# Patient Record
Sex: FEMALE
[De-identification: names, ages, dates, MRNs, and addresses within clinical notes are randomized per-mention and may not be internally consistent; named-entity substitution may affect disease eponyms.]

---

## 2022-09-26 ENCOUNTER — HOSPITAL ENCOUNTER (EMERGENCY)
Dept: HOSPITAL 75 - ER | Age: 37
Discharge: HOME | End: 2022-09-26
Payer: SELF-PAY

## 2022-09-26 VITALS — BODY MASS INDEX: 29.08 KG/M2 | HEIGHT: 62.01 IN | WEIGHT: 160.06 LBS

## 2022-09-26 VITALS — SYSTOLIC BLOOD PRESSURE: 114 MMHG | DIASTOLIC BLOOD PRESSURE: 69 MMHG

## 2022-09-26 DIAGNOSIS — K20.90: Primary | ICD-10-CM

## 2022-09-26 DIAGNOSIS — Z28.310: ICD-10-CM

## 2022-09-26 LAB
ALBUMIN SERPL-MCNC: 4 GM/DL (ref 3.2–4.5)
ALP SERPL-CCNC: 114 U/L (ref 40–136)
ALT SERPL-CCNC: 222 U/L (ref 0–55)
APTT BLD: 30 SEC (ref 24–35)
BASOPHILS # BLD AUTO: 0.1 10^3/UL (ref 0–0.1)
BASOPHILS NFR BLD AUTO: 1 % (ref 0–10)
BILIRUB SERPL-MCNC: 0.6 MG/DL (ref 0.1–1)
BUN/CREAT SERPL: 13
CALCIUM SERPL-MCNC: 9.2 MG/DL (ref 8.5–10.1)
CHLORIDE SERPL-SCNC: 103 MMOL/L (ref 98–107)
CO2 SERPL-SCNC: 25 MMOL/L (ref 21–32)
CREAT SERPL-MCNC: 0.93 MG/DL (ref 0.6–1.3)
EOSINOPHIL # BLD AUTO: 0.3 10^3/UL (ref 0–0.3)
EOSINOPHIL NFR BLD AUTO: 3 % (ref 0–10)
GFR SERPLBLD BASED ON 1.73 SQ M-ARVRAT: 81 ML/MIN
GLUCOSE SERPL-MCNC: 143 MG/DL (ref 70–105)
HCT VFR BLD CALC: 40 % (ref 35–52)
HGB BLD-MCNC: 13.3 G/DL (ref 11.5–16)
INR PPP: 1 (ref 0.8–1.4)
LYMPHOCYTES # BLD AUTO: 2.2 10^3/UL (ref 1–4)
LYMPHOCYTES NFR BLD AUTO: 24 % (ref 12–44)
MAGNESIUM SERPL-MCNC: 2.1 MG/DL (ref 1.6–2.4)
MANUAL DIFFERENTIAL PERFORMED BLD QL: NO
MCH RBC QN AUTO: 27 PG (ref 25–34)
MCHC RBC AUTO-ENTMCNC: 33 G/DL (ref 32–36)
MCV RBC AUTO: 82 FL (ref 80–99)
MONOCYTES # BLD AUTO: 0.6 10^3/UL (ref 0–1)
MONOCYTES NFR BLD AUTO: 7 % (ref 0–12)
NEUTROPHILS # BLD AUTO: 6 10^3/UL (ref 1.8–7.8)
NEUTROPHILS NFR BLD AUTO: 66 % (ref 42–75)
PLATELET # BLD: 345 10^3/UL (ref 130–400)
PMV BLD AUTO: 10.1 FL (ref 9–12.2)
POTASSIUM SERPL-SCNC: 4.1 MMOL/L (ref 3.6–5)
PROT SERPL-MCNC: 8.3 GM/DL (ref 6.4–8.2)
PROTHROMBIN TIME: 13.7 SEC (ref 12.2–14.7)
SODIUM SERPL-SCNC: 139 MMOL/L (ref 135–145)
WBC # BLD AUTO: 9.1 10^3/UL (ref 4.3–11)

## 2022-09-26 PROCEDURE — 83874 ASSAY OF MYOGLOBIN: CPT

## 2022-09-26 PROCEDURE — 85730 THROMBOPLASTIN TIME PARTIAL: CPT

## 2022-09-26 PROCEDURE — 93005 ELECTROCARDIOGRAM TRACING: CPT

## 2022-09-26 PROCEDURE — 36415 COLL VENOUS BLD VENIPUNCTURE: CPT

## 2022-09-26 PROCEDURE — 85379 FIBRIN DEGRADATION QUANT: CPT

## 2022-09-26 PROCEDURE — 84484 ASSAY OF TROPONIN QUANT: CPT

## 2022-09-26 PROCEDURE — 71045 X-RAY EXAM CHEST 1 VIEW: CPT

## 2022-09-26 PROCEDURE — 80053 COMPREHEN METABOLIC PANEL: CPT

## 2022-09-26 PROCEDURE — 85610 PROTHROMBIN TIME: CPT

## 2022-09-26 PROCEDURE — 85025 COMPLETE CBC W/AUTO DIFF WBC: CPT

## 2022-09-26 PROCEDURE — 83880 ASSAY OF NATRIURETIC PEPTIDE: CPT

## 2022-09-26 PROCEDURE — 83735 ASSAY OF MAGNESIUM: CPT

## 2022-09-26 PROCEDURE — 93041 RHYTHM ECG TRACING: CPT

## 2022-09-26 NOTE — ED CHEST PAIN
General


Chief Complaint:  Chest Pain


Stated Complaint:  CHEST PAIN


Nursing Triage Note:  


PT TO RM 4 VIA WC W C/O CP SX APPROX 0900 MID CHEST, PAIN RELIEVED BY DRINKING 


MILK. PT A&OX4.


Source:  patient


Exam Limitations:  no limitations





History of Present Illness


Date Seen by Provider:  Sep 26, 2022


Time Seen by Provider:  13:09


Initial Comments


To ER by private vehicle from home with reports of central chest pain rated at 9

out of 10 that started at 9 AM this morning while at work.  No fevers chills 

cough or shortness of breath.  She occasionally has this at home and drinking 

milk helps.  She has had this before while in the [place] and was going

to see a doctor but decided to just pray about it instead.  She takes no 

medications.


Timing/Duration:  changing over time


Severity/Quality:  moderate, severe


Location:  central


Radiation:  no radiation


Activities at Onset:  none


Prior CP/Workup:  no prior chest pain


ASA po PTA:  No


NTG SL PTA:  No


Associated Symptoms:  denies symptoms





Allergies and Home Medications


Allergies


Coded Allergies:  


     No Known Drug Allergies (Unverified , 9/26/22)





Patient Home Medication List


Home Medication List Reviewed:  Yes





Review of Systems


Review of Systems


Constitutional:  see HPI


EENTM:  No Symptoms Reported


Respiratory:  No Symptoms Reported


Cardiovascular:  See HPI, Chest Pain


Gastrointestinal:  No Symptoms Reported


Genitourinary:  No Symptoms Reported


Musculoskeletal:  no symptoms reported


Skin:  no symptoms reported


Psychiatric/Neurological:  No Symptoms Reported


Endocrine:  No Symptoms Reported


Hematologic/Lymphatic:  No Symptoms Reported





Past Medical-Social-Family Hx


Patient Social History


Tobacco Use?:  No


Use of E-Cig and/or Vaping dev:  No


Substance use?:  No


Alcohol Use?:  No





Immunizations Up To Date


Influenza Vaccine Up-to-Date:  No; Not Current


First/Initial COVID19 Vaccinat:  NONE


Second COVID19 Vaccination James:  NONE


Third COVID19 Vaccination Date:  NONE


COVID19 Vaccine :  NONE





Past Medical History


Last Menstrual Period:  Sep 27, 2022





Physical Exam


Vital Signs





Vital Signs - First Documented








 9/26/22





 12:55


 


Temp 36.6


 


Pulse 64


 


Resp 20


 


B/P (MAP) 122/68 (86)


 


Pulse Ox 98


 


O2 Delivery Room Air





Capillary Refill : Less Than 3 Seconds


Height, Weight, BMI


Height: '"


Weight: lbs. oz. kg; 29.00 BMI


Method:


General Appearance:  No Apparent Distress, WD/WN


HEENT:  PERRL/EOMI, TMs Normal


Neck:  Full Range of Motion, Normal Inspection


Respiratory:  Lungs Clear, Normal Breath Sounds, No Accessory Muscle Use, No 

Respiratory Distress


Cardiovascular:  Regular Rate, Rhythm, Normal Peripheral Pulses


Gastrointestinal:  Normal Bowel Sounds, Non Tender, Soft


Extremity:  Normal Capillary Refill, Normal Inspection


Neurologic/Psychiatric:  Alert, Oriented x3


Skin:  Normal Color, Warm/Dry





Progress/Results/Core Measures


Results/Orders


Lab Results





Laboratory Tests








Test


 9/26/22


13:12 Range/Units


 


 


White Blood Count


 9.1 


 4.3-11.0


10^3/uL


 


Red Blood Count


 4.87 


 3.80-5.11


10^6/uL


 


Hemoglobin 13.3  11.5-16.0  g/dL


 


Hematocrit 40  35-52  %


 


Mean Corpuscular Volume 82  80-99  fL


 


Mean Corpuscular Hemoglobin 27  25-34  pg


 


Mean Corpuscular Hemoglobin


Concent 33 


 32-36  g/dL





 


Red Cell Distribution Width 11.9  10.0-14.5  %


 


Platelet Count


 345 


 130-400


10^3/uL


 


Mean Platelet Volume 10.1  9.0-12.2  fL


 


Immature Granulocyte % (Auto) 0   %


 


Neutrophils (%) (Auto) 66  42-75  %


 


Lymphocytes (%) (Auto) 24  12-44  %


 


Monocytes (%) (Auto) 7  0-12  %


 


Eosinophils (%) (Auto) 3  0-10  %


 


Basophils (%) (Auto) 1  0-10  %


 


Neutrophils # (Auto)


 6.0 


 1.8-7.8


10^3/uL


 


Lymphocytes # (Auto)


 2.2 


 1.0-4.0


10^3/uL


 


Monocytes # (Auto)


 0.6 


 0.0-1.0


10^3/uL


 


Eosinophils # (Auto)


 0.3 


 0.0-0.3


10^3/uL


 


Basophils # (Auto)


 0.1 


 0.0-0.1


10^3/uL


 


Immature Granulocyte # (Auto)


 0.0 


 0.0-0.1


10^3/uL


 


Prothrombin Time 13.7  12.2-14.7  SEC


 


INR Comment 1.0  0.8-1.4  


 


Activated Partial


Thromboplast Time 30 


 24-35  SEC





 


D-Dimer


 0.44 


 0.00-0.49


UG/ML


 


Sodium Level 139  135-145  MMOL/L


 


Potassium Level 4.1  3.6-5.0  MMOL/L


 


Chloride Level 103    MMOL/L


 


Carbon Dioxide Level 25  21-32  MMOL/L


 


Anion Gap 11  5-14  MMOL/L


 


Blood Urea Nitrogen 12  7-18  MG/DL


 


Creatinine


 0.93 


 0.60-1.30


MG/DL


 


Estimat Glomerular Filtration


Rate 81 


  





 


BUN/Creatinine Ratio 13   


 


Glucose Level 143 H   MG/DL


 


Calcium Level 9.2  8.5-10.1  MG/DL


 


Corrected Calcium 9.2  8.5-10.1  MG/DL


 


Magnesium Level 2.1  1.6-2.4  MG/DL


 


Total Bilirubin 0.6  0.1-1.0  MG/DL


 


Aspartate Amino Transf


(AST/SGOT) 395 H


 5-34  U/L





 


Alanine Aminotransferase


(ALT/SGPT) 222 H


 0-55  U/L





 


Alkaline Phosphatase 114    U/L


 


Myoglobin


 42.4 


 10.0-92.0


NG/ML


 


Troponin I < 0.028  <0.028  NG/ML


 


B-Type Natriuretic Peptide < 10.0  <100.0  PG/ML


 


Total Protein 8.3 H 6.4-8.2  GM/DL


 


Albumin 4.0  3.2-4.5  GM/DL








My Orders





Orders - JEFFERSON DELATORRE


Ekg Tracing (9/26/22 12:59)


Cbc With Automated Diff (9/26/22 13:04)


Magnesium (9/26/22 13:04)


Chest 1 View, Ap/Pa Only (9/26/22 13:04)


Ekg Tracing (9/26/22 13:04)


Comprehensive Metabolic Panel (9/26/22 13:04)


Myoglobin Serum (9/26/22 13:04)


Protime With Inr (9/26/22 13:04)


Partial Thromboplastin Time (9/26/22 13:04)


O2 (9/26/22 13:04)


Monitor-Rhythm Ecg Trace Only (9/26/22 13:04)


Lipid Panel (9/27/22 06:00)


Ed Iv/Invasive Line Start (9/26/22 13:04)


Bnp [place] (9/26/22 13:04)


Fibrin Degradation Products (9/26/22 13:04)


Troponin I Denise (9/26/22 13:04)


Antacid  Suspension (Mylanta  Suspension (9/26/22 13:15)


Lidocaine 2% Viscous 15 Ml (Xylocaine Vi (9/26/22 13:15)





Medications Given in ED





Current Medications








 Medications  Dose


 Ordered  Sig/Katya


 Route  Start Time


 Stop Time Status Last Admin


Dose Admin


 


 Al Hydrox/Mg


 Hydrox/Simethicone  30 ml  ONCE  ONCE


 PO  9/26/22 13:15


 9/26/22 13:16 DC 9/26/22 13:22


30 ML


 


 Lidocaine HCl  15 ml  ONCE  ONCE


 PO  9/26/22 13:15


 9/26/22 13:16 DC 9/26/22 13:22


15 ML








Vital Signs/I&O











 9/26/22





 12:55


 


Temp 36.6


 


Pulse 64


 


Resp 20


 


B/P (MAP) 122/68 (86)


 


Pulse Ox 98


 


O2 Delivery Room Air














Blood Pressure Mean:                    86











Departure


Communication (Admissions)


1421-chest pain is gone.  I will start her on a PPI.  She will need to follow-up

with primary care for the elevated LFTs.





Impression





   Primary Impression:  


   Esophagitis


Disposition:  01 HOME, SELF-CARE


Condition:  Stable





Departure-Patient Inst.


Decision time for Depature:  14:05


Referrals:  


[facility] (PCP/Family)


Primary Care Physician


Patient Instructions:  Acid Reflux, Adult and Adolescent ED


Scripts


Pantoprazole Sodium (Pantoprazole Sodium) 40 Mg Tablet.


40 MG PO DAILY, #30 TAB


   Prov: JEFFERSON DELATORRE         9/26/22


Work/School Note:  Work Release Form   Date Seen in the Emergency Department:  

Sep 26, 2022


   Return to Work:  Sep 27, 2022


   Restrictions:  No Restrictions











JEFFERSON DELATORRE             Sep 26, 2022 13:10

## 2022-09-26 NOTE — DIAGNOSTIC IMAGING REPORT
EXAMINATION: 

Chest, 1 view.



HISTORY: 

Chest pain.



COMPARISON: 

None available.



FINDINGS: 

Heart size and pulmonary vasculature are normal. The lungs are

clear without consolidation, pleural effusion, or pneumothorax.

The osseous structures are intact.



IMPRESSION: 

No acute radiographic abnormality in the chest.



Dictated by: 



  Dictated on workstation # [id number]

## 2022-10-01 ENCOUNTER — HOSPITAL ENCOUNTER (INPATIENT)
Dept: HOSPITAL 75 - ER | Age: 37
LOS: 3 days | Discharge: HOME | DRG: 417 | End: 2022-10-04
Attending: INTERNAL MEDICINE | Admitting: INTERNAL MEDICINE
Payer: COMMERCIAL

## 2022-10-01 VITALS — SYSTOLIC BLOOD PRESSURE: 105 MMHG | DIASTOLIC BLOOD PRESSURE: 64 MMHG

## 2022-10-01 VITALS — DIASTOLIC BLOOD PRESSURE: 74 MMHG | SYSTOLIC BLOOD PRESSURE: 110 MMHG

## 2022-10-01 VITALS — DIASTOLIC BLOOD PRESSURE: 65 MMHG | SYSTOLIC BLOOD PRESSURE: 103 MMHG

## 2022-10-01 VITALS — SYSTOLIC BLOOD PRESSURE: 112 MMHG | DIASTOLIC BLOOD PRESSURE: 62 MMHG

## 2022-10-01 VITALS — SYSTOLIC BLOOD PRESSURE: 103 MMHG | DIASTOLIC BLOOD PRESSURE: 67 MMHG

## 2022-10-01 VITALS — HEIGHT: 60.08 IN | BODY MASS INDEX: 32.55 KG/M2 | WEIGHT: 167.99 LBS

## 2022-10-01 DIAGNOSIS — K85.10: ICD-10-CM

## 2022-10-01 DIAGNOSIS — K80.00: Primary | ICD-10-CM

## 2022-10-01 DIAGNOSIS — E11.65: ICD-10-CM

## 2022-10-01 DIAGNOSIS — N39.0: ICD-10-CM

## 2022-10-01 LAB
ALBUMIN SERPL-MCNC: 3.6 GM/DL (ref 3.2–4.5)
ALP SERPL-CCNC: 173 U/L (ref 40–136)
ALT SERPL-CCNC: 195 U/L (ref 0–55)
APTT PPP: (no result) S
BACTERIA #/AREA URNS HPF: (no result) /HPF
BASOPHILS # BLD AUTO: 0 10^3/UL (ref 0–0.1)
BASOPHILS NFR BLD AUTO: 0 % (ref 0–10)
BILIRUB SERPL-MCNC: 1.3 MG/DL (ref 0.1–1)
BILIRUB UR QL STRIP: NEGATIVE
BUN/CREAT SERPL: 14
CALCIUM SERPL-MCNC: 8.9 MG/DL (ref 8.5–10.1)
CHLORIDE SERPL-SCNC: 103 MMOL/L (ref 98–107)
CO2 SERPL-SCNC: 21 MMOL/L (ref 21–32)
CREAT SERPL-MCNC: 0.8 MG/DL (ref 0.6–1.3)
EOSINOPHIL # BLD AUTO: 0.4 10^3/UL (ref 0–0.3)
EOSINOPHIL NFR BLD AUTO: 4 % (ref 0–10)
FIBRINOGEN PPP-MCNC: (no result) MG/DL
GFR SERPLBLD BASED ON 1.73 SQ M-ARVRAT: 97 ML/MIN
GLUCOSE SERPL-MCNC: 202 MG/DL (ref 70–105)
GLUCOSE UR STRIP-MCNC: (no result) MG/DL
HCT VFR BLD CALC: 37 % (ref 35–52)
HGB BLD-MCNC: 12.2 G/DL (ref 11.5–16)
KETONES UR QL STRIP: (no result)
LEUKOCYTE ESTERASE UR QL STRIP: (no result)
LYMPHOCYTES # BLD AUTO: 1.8 10^3/UL (ref 1–4)
LYMPHOCYTES NFR BLD AUTO: 19 % (ref 12–44)
MANUAL DIFFERENTIAL PERFORMED BLD QL: NO
MCH RBC QN AUTO: 27 PG (ref 25–34)
MCHC RBC AUTO-ENTMCNC: 33 G/DL (ref 32–36)
MCV RBC AUTO: 82 FL (ref 80–99)
MONOCYTES # BLD AUTO: 0.6 10^3/UL (ref 0–1)
MONOCYTES NFR BLD AUTO: 7 % (ref 0–12)
NEUTROPHILS # BLD AUTO: 6.5 10^3/UL (ref 1.8–7.8)
NEUTROPHILS NFR BLD AUTO: 69 % (ref 42–75)
NITRITE UR QL STRIP: POSITIVE
PH UR STRIP: 7 [PH] (ref 5–9)
PLATELET # BLD: 325 10^3/UL (ref 130–400)
PMV BLD AUTO: 10.5 FL (ref 9–12.2)
POTASSIUM SERPL-SCNC: 4.1 MMOL/L (ref 3.6–5)
PROT SERPL-MCNC: 7.9 GM/DL (ref 6.4–8.2)
PROT UR QL STRIP: (no result)
RBC #/AREA URNS HPF: (no result) /HPF
SODIUM SERPL-SCNC: 135 MMOL/L (ref 135–145)
SP GR UR STRIP: 1.02 (ref 1.02–1.02)
SQUAMOUS #/AREA URNS HPF: (no result) /HPF
WBC # BLD AUTO: 9.4 10^3/UL (ref 4.3–11)

## 2022-10-01 PROCEDURE — 76705 ECHO EXAM OF ABDOMEN: CPT

## 2022-10-01 PROCEDURE — 85025 COMPLETE CBC W/AUTO DIFF WBC: CPT

## 2022-10-01 PROCEDURE — 80053 COMPREHEN METABOLIC PANEL: CPT

## 2022-10-01 PROCEDURE — BF522Z0 OTHER IMAGING OF GALLBLADDER USING FLUORESCING AGENT, INTRAOPERATIVE: ICD-10-PCS | Performed by: SURGERY

## 2022-10-01 PROCEDURE — 74176 CT ABD & PELVIS W/O CONTRAST: CPT

## 2022-10-01 PROCEDURE — 82947 ASSAY GLUCOSE BLOOD QUANT: CPT

## 2022-10-01 PROCEDURE — 76000 FLUOROSCOPY <1 HR PHYS/QHP: CPT

## 2022-10-01 PROCEDURE — 81000 URINALYSIS NONAUTO W/SCOPE: CPT

## 2022-10-01 PROCEDURE — 84478 ASSAY OF TRIGLYCERIDES: CPT

## 2022-10-01 PROCEDURE — 84703 CHORIONIC GONADOTROPIN ASSAY: CPT

## 2022-10-01 PROCEDURE — 87081 CULTURE SCREEN ONLY: CPT

## 2022-10-01 PROCEDURE — 83690 ASSAY OF LIPASE: CPT

## 2022-10-01 PROCEDURE — 0FT44ZZ RESECTION OF GALLBLADDER, PERCUTANEOUS ENDOSCOPIC APPROACH: ICD-10-PCS | Performed by: SURGERY

## 2022-10-01 PROCEDURE — 87088 URINE BACTERIA CULTURE: CPT

## 2022-10-01 PROCEDURE — 36415 COLL VENOUS BLD VENIPUNCTURE: CPT

## 2022-10-01 RX ADMIN — ENOXAPARIN SODIUM SCH MG: 100 INJECTION SUBCUTANEOUS at 12:07

## 2022-10-01 RX ADMIN — INSULIN ASPART SCH UNIT: 100 INJECTION, SOLUTION INTRAVENOUS; SUBCUTANEOUS at 17:23

## 2022-10-01 RX ADMIN — SODIUM CHLORIDE SCH MLS/HR: 900 INJECTION, SOLUTION INTRAVENOUS at 13:53

## 2022-10-01 RX ADMIN — INSULIN ASPART SCH UNIT: 100 INJECTION, SOLUTION INTRAVENOUS; SUBCUTANEOUS at 17:22

## 2022-10-01 RX ADMIN — SODIUM CHLORIDE SCH MLS/HR: 900 INJECTION, SOLUTION INTRAVENOUS at 17:36

## 2022-10-01 RX ADMIN — DEXTROSE MONOHYDRATE AND SODIUM CHLORIDE SCH MLS/HR: 5; .9 INJECTION, SOLUTION INTRAVENOUS at 20:45

## 2022-10-01 RX ADMIN — SENNOSIDES SCH MG: 8.6 TABLET, FILM COATED ORAL at 20:44

## 2022-10-01 RX ADMIN — DOCUSATE SODIUM SCH MG: 100 CAPSULE ORAL at 20:44

## 2022-10-01 RX ADMIN — METOCLOPRAMIDE SCH MG: 5 INJECTION, SOLUTION INTRAMUSCULAR; INTRAVENOUS at 11:58

## 2022-10-01 RX ADMIN — ENOXAPARIN SODIUM SCH MG: 100 INJECTION SUBCUTANEOUS at 11:58

## 2022-10-01 RX ADMIN — INSULIN ASPART SCH UNIT: 100 INJECTION, SOLUTION INTRAVENOUS; SUBCUTANEOUS at 23:23

## 2022-10-01 RX ADMIN — METOCLOPRAMIDE SCH MG: 5 INJECTION, SOLUTION INTRAMUSCULAR; INTRAVENOUS at 17:42

## 2022-10-01 RX ADMIN — METOCLOPRAMIDE SCH MG: 5 INJECTION, SOLUTION INTRAMUSCULAR; INTRAVENOUS at 23:18

## 2022-10-01 NOTE — ED ABDOMINAL PAIN
General


Chief Complaint:  Abdominal/GI Problems


Stated Complaint:  LOWER ABD PAIN


Nursing Triage Note:  


ARRIVED VIA EMS FROM HOME WITH COMPLAINTS OF ABD PAIN X1 WEEK.  ATTEMPT TO USE 


LANGUAGE LINE BUT HER LANGUAGE IS NOT AVAILABLE.  16 YEAR OLD DAUGHTER HELP IS 


LIMITED


Exam Limitations:  Language Barrier





History of Present Illness


Date Seen by Provider:  Oct 1, 2022


Time Seen by Provider:  07:48


Initial Comments


Patient is a 37-year-old [place]ut[place] lady who presents to the emergency 

department today with a chief complaint of upper abdominal pain.  Her 15yo 

daughter is with her and she speaks some limited Bhutanese.  She is unable to 

acurately provide a history of her mother.  A friend was called who was able to 

provide a little bit better interpretation.





Apparently her pain started in the middle of the night. She describes it as a 

"burning".  Some nausea.  No fevers. No chest pain, cough or shortness of 

breath.  She states she has had some pain like this about 2 weeks ago when she 

was in the ER.


Review of the medical record shows she was here for chest pain (her only prior 

visit).  She reports she has no chronic medical problems - takes no daily 

medications.  Only previous abdominal surgery was a .  She does not use

alcohol.  Attend the [facility] walk in clinic but is not established with a primary 

care doctor.





Denies diarrhea/black or bloody stools. No painful urination.  On her menstrual 

cycle right now.





All other ROS reviewed and negative except as stated.


Timing/Duration:  1 Week


Severity/Quality:  Severe


Location:  [facility], [facility]


Radiation:  Other (unknown)





Allergies and Home Medications


Allergies


Coded Allergies:  


     No Known Drug Allergies (Unverified , 22)





Patient Home Medication List


Home Medication List Reviewed:  Yes


Pantoprazole Sodium (Pantoprazole Sodium) 40 Mg Tablet., 40 MG PO DAILY


   Prescribed by: JEFFERSON DELATORRE on 22 1422





Review of Systems


Review of Systems


Constitutional:  see HPI


Gastrointestinal:  Abdominal Pain


Other Comments


HPI and ROS limited by language - no  available on language line and 

15yo daughter does not speak her mother's language very well





Past Medical-Social-Family Hx


Immunizations Up To Date


First/Initial COVID19 Vaccinat:  NONE


Second COVID19 Vaccination James:  NONE


Third COVID19 Vaccination Date:  NONE





Physical Exam


Vital Signs





Vital Signs - First Documented








 10/1/22





 07:38


 


Temp 36.2


 


Pulse 68


 


Resp 16


 


B/P (MAP) 112/62 (79)


 


Pulse Ox 97


 


O2 Delivery Room Air





Capillary Refill : Less Than 3 Seconds


Height/Weight/BMI


Height: '"


Weight: lbs. oz. kg; 28.00 BMI


Method:


General Appearance:  WD/WN, moderate distress


HEENT:  PERRL/EOMI


Neck:  normal inspection


Respiratory:  lungs clear, normal breath sounds, no respiratory distress


Cardiovascular:  regular rate, rhythm


Gastrointestinal:  normal bowel sounds, soft, guarding, tenderness (upper 

quadrants bilaterally)


Extremities:  normal range of motion, normal inspection, no calf tenderness


Neurologic/Psychiatric:  no motor/sensory deficits, alert, normal mood/affect, 

oriented x 3


Skin:  normal color, warm/dry





Progress/Results/Core Measures


Results/Orders


Lab Results





Laboratory Tests








Test


 10/1/22


07:50 10/1/22


08:23 Range/Units


 


 


White Blood Count


 9.4 


 


 4.3-11.0


10^3/uL


 


Red Blood Count


 4.49 


 


 3.80-5.11


10^6/uL


 


Hemoglobin 12.2   11.5-16.0  g/dL


 


Hematocrit 37   35-52  %


 


Mean Corpuscular Volume 82   80-99  fL


 


Mean Corpuscular Hemoglobin 27   25-34  pg


 


Mean Corpuscular Hemoglobin


Concent 33 


 


 32-36  g/dL





 


Red Cell Distribution Width 12.2   10.0-14.5  %


 


Platelet Count


 325 


 


 130-400


10^3/uL


 


Mean Platelet Volume 10.5   9.0-12.2  fL


 


Immature Granulocyte % (Auto) 0    %


 


Neutrophils (%) (Auto) 69   42-75  %


 


Lymphocytes (%) (Auto) 19   12-44  %


 


Monocytes (%) (Auto) 7   0-12  %


 


Eosinophils (%) (Auto) 4   0-10  %


 


Basophils (%) (Auto) 0   0-10  %


 


Neutrophils # (Auto)


 6.5 


 


 1.8-7.8


10^3/uL


 


Lymphocytes # (Auto)


 1.8 


 


 1.0-4.0


10^3/uL


 


Monocytes # (Auto)


 0.6 


 


 0.0-1.0


10^3/uL


 


Eosinophils # (Auto)


 0.4 H


 


 0.0-0.3


10^3/uL


 


Basophils # (Auto)


 0.0 


 


 0.0-0.1


10^3/uL


 


Immature Granulocyte # (Auto)


 0.0 


 


 0.0-0.1


10^3/uL


 


Sodium Level 135   135-145  MMOL/L


 


Potassium Level 4.1   3.6-5.0  MMOL/L


 


Chloride Level 103     MMOL/L


 


Carbon Dioxide Level 21   21-32  MMOL/L


 


Anion Gap 11   5-14  MMOL/L


 


Blood Urea Nitrogen 11   7-18  MG/DL


 


Creatinine


 0.80 


 


 0.60-1.30


MG/DL


 


Estimat Glomerular Filtration


Rate 97 


 


  





 


BUN/Creatinine Ratio 14    


 


Glucose Level 202 H    MG/DL


 


Calcium Level 8.9   8.5-10.1  MG/DL


 


Corrected Calcium 9.2   8.5-10.1  MG/DL


 


Total Bilirubin 1.3 H  0.1-1.0  MG/DL


 


Aspartate Amino Transf


(AST/SGOT) 173 H


 


 5-34  U/L





 


Alanine Aminotransferase


(ALT/SGPT) 195 H


 


 0-55  U/L





 


Alkaline Phosphatase 173 H    U/L


 


Total Protein 7.9   6.4-8.2  GM/DL


 


Albumin 3.6   3.2-4.5  GM/DL


 


Triglycerides Level 69   <150  MG/DL


 


Lipase 2290 H  8-78  U/L


 


Urine Color  RED H  


 


Urine Clarity  SL CLOUDY   


 


Urine pH  7.0  5-9  


 


Urine Specific Gravity  1.025 H 1.016-1.022  


 


Urine Protein  3+ H NEGATIVE  


 


Urine Glucose (UA)  TRACE H NEGATIVE  


 


Urine Ketones  TRACE H NEGATIVE  


 


Urine Nitrite  POSITIVE H NEGATIVE  


 


Urine Bilirubin  NEGATIVE  NEGATIVE  


 


Urine Urobilinogen  >=8.0  < = 1.0  MG/DL


 


Urine Leukocyte Esterase  1+ H NEGATIVE  


 


Urine RBC (Auto)  3+ H NEGATIVE  


 


Urine RBC  TNTC H  /HPF


 


Urine WBC  10-25 H  /HPF


 


Urine Squamous Epithelial


Cells 


 5-10 


  /HPF





 


Urine Crystals  NONE   /LPF


 


Urine Bacteria  LARGE H  /HPF


 


Urine Casts  NONE   /LPF


 


Urine Mucus  NEGATIVE   /LPF


 


Urine Culture Indicated  YES   








My Orders





Orders - CASSIE CASTILLO MD


Ed Iv/Invasive Line Start (10/1/22 08:00)


Cbc With Automated Diff (10/1/22 08:00)


Comprehensive Metabolic Panel (10/1/22 08:00)


Ua Culture If Indicated (10/1/22 08:00)


Urine Pregnancy Bedside (10/1/22 08:00)


Ns Iv 1000 Ml (Sodium Chloride 0.9%) (10/1/22 08:00)


Ketorolac Injection (Toradol Injection) (10/1/22 08:00)


Ondansetron Injection (Zofran Injectio (10/1/22 08:00)


Ct Abdomen/Pelvis Wo (10/1/22 08:00)


Lipase (10/1/22 08:26)


Urine Culture (10/1/22 08:23)


Triglycerides (10/1/22 09:13)


Us Gallbladder 10684 (10/1/22 09:13)


Fentanyl  Inj (Sublimaze Injection) (10/1/22 09:15)





Medications Given in ED





Current Medications








 Medications  Dose


 Ordered  Sig/Katya


 Route  Start Time


 Stop Time Status Last Admin


Dose Admin


 


 Fentanyl Citrate  50 mcg  ONCE  ONCE


 IVP  10/1/22 09:15


 10/1/22 09:16 DC 10/1/22 09:27


50 MCG


 


 Ketorolac


 Tromethamine  15 mg  ONCE  ONCE


 IVP  10/1/22 08:00


 10/1/22 08:02 DC 10/1/22 08:09


15 MG


 


 Ondansetron HCl  4 mg  ONCE  ONCE


 IVP  10/1/22 08:00


 10/1/22 08:02 DC 10/1/22 08:09


4 MG








Vital Signs/I&O











 10/1/22





 07:38


 


Temp 36.2


 


Pulse 68


 


Resp 16


 


B/P (MAP) 112/62 (79)


 


Pulse Ox 97


 


O2 Delivery Room Air














Blood Pressure Mean:                    79











Progress


Progress Note :  


   Time:  09:27


Progress Note


Patient's lipase is quite elevated - still with pain.  transaminitis with a n

ormal WBC.  CT shows prominent pancreas with Gallstones and likely sludge. no 

sig amount of pericholecystic fluid.  Will add GB US to ruleout acute 

cholecystitis with the pancreatitis.  Added triglyceride levels.





Diagnostic Imaging





   Diagonstic Imaging:  CT


Comments


                 [facility].


                                [place], [place]





NAME:   JUAN J LORD


MED REC#:   [id number]


ACCOUNT#:   [id number]


PT STATUS:   REG ER


:   1985


PHYSICIAN:   CASSIE CASTILLO MD


ADMIT DATE:   10/01/22/ER


                                   ***Draft***


Date of Exam:10/01/22





CT ABDOMEN/PELVIS WO








PROCEDURE: CT abdomen and pelvis without contrast.





TECHNIQUE: Multiple contiguous axial images were obtained through


the abdomen and pelvis without the use of intravenous contrast.


Auto Exposure Controls were utilized during the CT exam to meet


ALARA standards for radiation dose reduction. 





INDICATION: Severe upper abdominal pain for one week.





COMPARISON: None.





DISCUSSION: Minimal atelectasis noted within the lung bases.


Normal heart size. No pleural or pericardial fluid. There are


mild inflammatory changes within the central mesentery within the


upper abdomen. These appear to also involve the pancreatic body


and, to a lesser degree, the head. Findings could be seen with


acute uncomplicated pancreatitis in the appropriate clinical


setting. Cholelithiasis is present. The liver, stomach, spleen,


and adrenal glands are unremarkable. No renal stone or


hydronephrosis. The aorta is normal in caliber. No evidence for


appendicitis. Bladder is decompressed. The uterus is


unremarkable. No obstruction or pneumatosis. No constipation. No


osseous abnormality identified.





IMPRESSION:


1. Inflammatory changes noted involving the pancreatic body and


extending into the central mesenteric region, most likely


represents acute uncomplicated pancreatitis. Recommend clinical


correlation with lipase levels.


2. Cholelithiasis. 





  Dictated on workstation # [id number]








Dict:   10/01/22 0858


Trans:   10/01/22 0907


[facility] [id number]





Interpreted by:     BELLA JIMENEZ MD


Electronically signed by:








   Diagonstic Imaging:  Ultrasound


Comments


                 [facility] [place], [place]





NAME:   JUAN J LORD


MED REC#:   [id number]


ACCOUNT#:   [id number]


PT STATUS:   REG ER


:   1985


PHYSICIAN:   CASSIE CASTILLO MD


ADMIT DATE:   10/01/22/ER


                                   ***Draft***


Date of Exam:10/01/22





US GALLBLADDER [id number]








PROCEDURE: US Gallbladder.





TECHNIQUE: Multiple real-time grayscale images were obtained over


the right upper quadrant in various projections.





INDICATION: Right upper quadrant pain.





COMPARISON: None.





DISCUSSION: Sonographic evaluation of the right upper quadrant


was performed. The liver appears normal in echotexture and size.


No hepatic mass identified. Numerous layering stones are noted


within the dependent gallbladder. No gallbladder wall thickening


or pericholecystic fluid. No evidence of intrahepatic biliary


ductal dilatation. Common bile duct and pancreas are mostly


obscured due to overlying bowel gas. The right kidney appears


normal in echotexture and size without evidence of hydronephrosis


or renal mass. The right kidney measures 10.8 cm. The IVC appears


within normal limits. There is no ascites or abnormal bowel loops


identified. No sonographic Jean sign was reported.





IMPRESSION:


1. Cholelithiasis. No secondary inflammatory changes. 





  Dictated on workstation # [id number]








Dict:   10/01/22 1035


Trans:   10/01/22 1041


[facility] [phone]





Interpreted by:     BELLA JIMENEZ MD


Electronically signed by:





Departure


Communication (Admissions)


Time/Spoke to Admitting Phy:  10:49


discussed with Dr Ardon


Time/Spoke to Consulting Phy:  10:45


discussed with Dr Ramirez





Impression





   Primary Impression:  


   Acute pancreatitis


   Qualified Codes:  K85.90 - Acute pancreatitis without necrosis or infection, 

   unspecified


   Additional Impressions:  


   Cholelithiasis


   Qualified Codes:  K80.20 - Calculus of gallbladder without cholecystitis 

   without obstruction


   Hyperglycemia, unspecified


Disposition:   ADMITTED AS INPATIENT


Condition:  Stable





Admissions


Decision to Admit Reason:  Admit from ER (General)


Decision to Admit/Date:  Oct 1, 2022


Time/Decision to Admit Time:  10:52





Departure-Patient Inst.


Referrals:  


[facility] (PCP/Family)


Primary Care Physician











CASSIE CASTILLO MD          Oct 1, 2022 08:03

## 2022-10-01 NOTE — CONSULTATION - SURGERY
KISHA MARTÍNEZ 10/1/22 1255:


History of Present Illness


History of Present Illness


Patient Consulted On(colette/time)


10/1/22


 12:48


Time Seen by Provider:  12:30


History of Present Illness


Pt is a 37F who presented to the ER this morning 10/1 with abdominal pain. She 

came to the hospital about one week ago with similar pain, but it has 

progressively gotten worse. When I reviewed the record her ER visit was for 

chest pain. She describes the abdominal pain as burning. Pain is diffuse in the 

abdomen but worse in the epigastric area. Pt states the pain radiates to her 

back diffusely. Lying down makes the pain more bearable while moving makes it 

worse. Pain is constant and rated 10/10. She is currently NPO. Last BM was  

and last urination was the morning of 10/1. History was attained by interp

retation from a relative on the phone.





Allergies and Home Medications


Allergies


Coded Allergies:  


     No Known Drug Allergies (Unverified , 22)





Patient Home Medication List


Home Medication List Reviewed:  Yes


Pantoprazole Sodium (Pantoprazole Sodium) 40 Mg Tablet., 40 MG PO DAILY


   Prescribed by: JEFFERSON DELATORRE on 22 9162





Past Medical-Social-Family Hx


Patient Social History


Smoking Status:  Never a Smoker


Alcohol Use?:  No


Have you traveled recently?:  No





Surgeries


History of Surgeries:  Yes


Surgeries:   Section





Respiratory


History of Respiratory Disorde:  No (no copd or asthma)





Cardiovascular


History of Cardiac Disorders:  No (denied htn and MI)





Gastrointestinal


History of Gastrointestinal Di:  Yes


Gastrointestinal Disorders:  Esophagitis (on pantroprazole)





Endocrine


History of Endocrine Disorders:  No (pt denies being diabetic despite increased 

glucose values)





Cancer


History of Cancer:  No (pt denies having ever had cancer)





Family Medical History


Significant Family History:  Diabetes, Other Conditions/Hx (pt denies any family

history of heart disease and cancer)





Review of Systems-General


Constitutional:  No chills, No fever


Respiratory:  No short of breath, No wheezing


Cardiovascular:  chest pain (pt said she has slight chest pain, more closely 

located to epigastric area); No palpitations


Gastrointestinal:  abdominal pain (epigastric area, radiates to back); No 

nausea, No vomiting


Genitourinary:  No dysuria, No pain


Musculoskeletal:  back pain (pain radiates from epigastrium to back); No neck 

pain


Skin:  No change in color, No hx of skin cancer


Psychiatric/Neurological:  Denies Depressed (pt denied depression), Denies Other

(pt denied suicidal ideation)





Physical Exam-General Problems


Physical Exam


Vital Signs





Vital Signs - First Documented








 10/1/22 10/1/22 10/1/22





 07:38 11:46 11:48


 


Temp 36.2  


 


Pulse 68  


 


Resp 16  


 


B/P (MAP) 112/62 (79)  


 


Pulse Ox 97  


 


O2 Delivery Room Air  


 


O2 Flow Rate   0.00


 


FiO2  21 





Capillary Refill : Less Than 3 Seconds


General Appearance:  WD/WN, mild distress (in obvious abdominal pain)


Eyes:  Bilateral Eye EOMI


HEENT:  No scleral icterus (R), No scleral icterus (L)


Neck:  non-tender, supple; No lymphadenopathy (R), No lymphadenopathy (L)


Respiratory:  lungs clear, normal breath sounds, no accessory muscle use


Cardiovascular:  regular rate, rhythm, no edema, no murmur


Peripheral Pulses:  2+ Radial Pulses (R), 2+ Radial Pulses (L)


Gastrointestinal:  normal bowel sounds, soft, tenderness (diffuse tenderness 

worse in epigastrium)


Rectal:  deferred


Back:  vertebral tenderness (pain radiating from front to back)


Extremities:  non-tender, no pedal edema, no calf tenderness


Neurologic/Psychiatric:  alert, oriented x 3; No facial droop


Skin:  normal color, warm/dry


Lymphatic:  no adenopathy (cervical)





Data Review


Labs


Laboratory Tests


10/1/22 07:50: 


White Blood Count 9.4, Red Blood Count 4.49, Hemoglobin 12.2, Hematocrit 37, 

Mean Corpuscular Volume 82, Mean Corpuscular Hemoglobin 27, Mean Corpuscular Hem

oglobin Concent 33, Red Cell Distribution Width 12.2, Platelet Count 325, Mean 

Platelet Volume 10.5, Immature Granulocyte % (Auto) 0, Neutrophils (%) (Auto) 

69, Lymphocytes (%) (Auto) 19, Monocytes (%) (Auto) 7, Eosinophils (%) (Auto) 4,

Basophils (%) (Auto) 0, Neutrophils # (Auto) 6.5, Lymphocytes # (Auto) 1.8, 

Monocytes # (Auto) 0.6, Eosinophils # (Auto) 0.4H, Basophils # (Auto) 0.0, 

Immature Granulocyte # (Auto) 0.0, Sodium Level 135, Potassium Level 4.1, 

Chloride Level 103, Carbon Dioxide Level 21, Anion Gap 11, Blood Urea Nitrogen 

11, Creatinine 0.80, Estimat Glomerular Filtration Rate 97, BUN/Creatinine Ratio

14, Glucose Level 202H, Calcium Level 8.9, Corrected Calcium 9.2, Total 

Bilirubin 1.3H, Aspartate Amino Transf (AST/SGOT) 173H, Alanine Aminotransferase

(ALT/SGPT) 195H, Alkaline Phosphatase 173H, Total Protein 7.9, Albumin 3.6, 

Triglycerides Level 69, Lipase 2290H


10/1/22 08:23: 


Urine Color REDH, Urine Clarity SL CLOUDY, Urine pH 7.0, Urine Specific Gravity 

1.025H, Urine Protein 3+H, Urine Glucose (UA) TRACEH, Urine Ketones TRACEH, 

Urine Nitrite POSITIVEH, Urine Bilirubin NEGATIVE, Urine Urobilinogen >=8.0, 

Urine Leukocyte Esterase 1+H, Urine RBC (Auto) 3+H, Urine RBC TNTCH, Urine WBC 

10-25H, Urine Squamous Epithelial Cells 5-10, Urine Crystals NONE, Urine 

Bacteria LARGEH, Urine Casts NONE, Urine Mucus NEGATIVE, Urine Culture Indicated

YES





Assessment/Plan


Pancreatitis likely secondary to stone


   lipase 2290, confirmed inflammation on CT abd/pelvis


Cholelithiasis


   gallbladder us positive for cholelithiasis


Transaminitis


Hyperglycemia





Plan:


Pt is on IV fluids bumped to 250ml/hr and pain medication, when the lipase and 

pain are decreased cholecystectomy is recommended to prevent further episodes of

gallstone pancreatitis. Dr. Ardon has started sliding scale insulin for 

hyperglycemia.





ITZ BUCIO DO 10/1/22 1335:


History of Present Illness


History of Present Illness


Time Seen by Provider:  12:05


History of Present Illness


Surgery asked to consult regarding Pancreatitis.





HPI per ED:  ARRIVED VIA EMS FROM HOME WITH COMPLAINTS OF ABD PAIN X1 WEEK.  

ATTEMPT TO USE LANGUAGE LINE BUT HER LANGUAGE IS NOT AVAILABLE.  15 YEAR OLD 

DAUGHTER HELP IS LIMITED





Patient is a 37-year-old [place] lady who presents to the emergency 

department today with a chief complaint of upper abdominal pain.  Her 15yo 

daughter is with her and she speaks some limited Gambian.  She is unable to 

acurately provide a history of her mother.  A friend was called who was able to 

provide a little bit better interpretation.  Apparently her pain started in the 

middle of the night. She describes it as a "burning".  Some nausea.  No fevers. 

No chest pain, cough or shortness of breath.  She states she has had some pain 

like this about 2 weeks ago when she was in the ER.  Review of the medical 

record shows she was here for chest pain (her only prior visit).  She reports 

she has no chronic medical problems - takes no daily medications.  Only previous

abdominal surgery was a .  She does not use alcohol.  Attend the [facility] 

walk in clinic but is not established with a primary care doctor.  Denies 

diarrhea/black or bloody stools. No painful urination.  On her menstrual cycle 

right now.





When I spoke to pt, she called a friend to help translate.  Still having 

abdominal pain, epigastric and shooting straight through to back.  Now most 

foods make pain worse.  Denied previous RUQ pain.





Allergies and Home Medications


Allergies


Coded Allergies:  


     No Known Drug Allergies (Unverified , 22)





Patient Home Medication List


Home Medication List Reviewed:  Yes


Pantoprazole Sodium (Pantoprazole Sodium) 40 Mg Tablet., 40 MG PO DAILY


   Prescribed by: JEFFERSON DELATORRE on 22 1422





Past Medical-Social-Family Hx


Patient Social History


Smoking Status:  Never a Smoker


Alcohol Use?:  No





Surgeries


History of Surgeries:  Yes


Surgeries:   Section





Respiratory


History of Respiratory Disorde:  No (no copd or asthma)





Cardiovascular


History of Cardiac Disorders:  No (denied htn and MI)





Neurological


History of Neurological Disord:  No





Genitourinary


History of Genitourinary Disor:  Yes


Genitourinary Disorders:  UTI-Chronic





Gastrointestinal


History of Gastrointestinal Di:  Yes


Gastrointestinal Disorders:  Gastroesophageal Reflux (on pantroprazole)





Musculoskeletal


History of Musculoskeletal Dis:  No





Endocrine


History of Endocrine Disorders:  No (pt denies being diabetic; however, she has 

increased glucose values)





HEENT


History of HEENT Disorders:  No


Loss of Vision:  Denies


Hearing Impairment:  Denies





Cancer


History of Cancer:  No (pt denies having ever had cancer)





Psychosocial


History of Psychiatric Problem:  No





Reviewed Nursing Assessment


Reviewed/Agree w Nursing PMH:  Yes





Family Medical History


Significant Family History:  Diabetes (mother and grandparents), Other 

Conditions/Hx (pt denies any family history of heart disease and cancer)





Review of Systems-General


Constitutional:  No chills, No fever


EENTM:  No blurred vision, No mouth swelling, No epistaxis


Respiratory:  No cough, No dyspnea on exertion, No short of breath, No wheezing


Cardiovascular:  chest pain (pt said she has slight chest pain, more closely 

located to epigastric area- non cardiac); No palpitations


Gastrointestinal:  abdominal pain (epigastric area, radiates to back); No 

nausea, No vomiting


Genitourinary:  No dysuria, No hematuria


Musculoskeletal:  back pain (pain radiates from epigastrium to back); No neck 

pain


Skin:  No change in color, No change in hair/nails, No hx of skin cancer


Psychiatric/Neurological:  Denies Anxiety, Denies Depressed, Denies Seizure, 

Denies Other (pt denied suicidal ideation)





Physical Exam-General Problems


Physical Exam


General Appearance:  WD/WN, mild distress (in obvious abdominal pain)


Eyes:  Bilateral Eye PERRL, Bilateral Eye EOMI


HEENT:  pharynx normal; No scleral icterus (R), No scleral icterus (L)


Neck:  non-tender, supple


Respiratory:  lungs clear, normal breath sounds, no respiratory distress, no 

accessory muscle use


Cardiovascular:  regular rate, rhythm, no edema, no murmur


Gastrointestinal:  soft, tenderness (diffuse tenderness worse in epigastrium), 

hernia (small umbilical)


Rectal:  deferred


Back:  no CVA tenderness


Extremities:  non-tender, no pedal edema, no calf tenderness


Neurologic/Psychiatric:  alert, oriented x 3; No facial droop


Skin:  normal color, warm/dry


Lymphatic:  no adenopathy (neck, axilla)





Data Review


Radiology


Date of Exam:10/01/22





CT ABDOMEN/PELVIS WO








PROCEDURE: CT abdomen and pelvis without contrast.





TECHNIQUE: Multiple contiguous axial images were obtained through


the abdomen and pelvis without the use of intravenous contrast.


Auto Exposure Controls were utilized during the CT exam to meet


ALARA standards for radiation dose reduction. 





INDICATION: Severe upper abdominal pain for one week.





COMPARISON: None.





DISCUSSION: Minimal atelectasis noted within the lung bases.


Normal heart size. No pleural or pericardial fluid. There are


mild inflammatory changes within the central mesentery within the


upper abdomen. These appear to also involve the pancreatic body


and, to a lesser degree, the head. Findings could be seen with


acute uncomplicated pancreatitis in the appropriate clinical


setting. Cholelithiasis is present. The liver, stomach, spleen,


and adrenal glands are unremarkable. No renal stone or


hydronephrosis. The aorta is normal in caliber. No evidence for


appendicitis. Bladder is decompressed. The uterus is


unremarkable. No obstruction or pneumatosis. No constipation. No


osseous abnormality identified.





IMPRESSION:


1. Inflammatory changes noted involving the pancreatic body and


extending into the central mesenteric region, most likely


represents acute uncomplicated pancreatitis. Recommend clinical


correlation with lipase levels.


2. Cholelithiasis. 





Dictated by: 





  Dictated on workstation # [id number]








Dict:   10/01/22 [id number]


Trans:   10/01/22 1324


AC [id number]





Interpreted by:     BELLA JIMENEZ MD


Electronically signed by: BELLA JIMENEZ MD 10/01/22 1324








Date of Exam:10/01/22





US GALLBLADDER [id number]








PROCEDURE: US Gallbladder.





TECHNIQUE: Multiple real-time grayscale images were obtained over


the right upper quadrant in various projections.





INDICATION: Right upper quadrant pain.





COMPARISON: None.





DISCUSSION: Sonographic evaluation of the right upper quadrant


was performed. The liver appears normal in echotexture and size.


No hepatic mass identified. Numerous layering stones are noted


within the dependent gallbladder. No gallbladder wall thickening


or pericholecystic fluid. No evidence of intrahepatic biliary


ductal dilatation. Common bile duct and pancreas are mostly


obscured due to overlying bowel gas. The right kidney appears


normal in echotexture and size without evidence of hydronephrosis


or renal mass. The right kidney measures 10.8 cm. The IVC appears


within normal limits. There is no ascites or abnormal bowel loops


identified. No sonographic Jean sign was reported.





IMPRESSION:


1. Cholelithiasis. No secondary inflammatory changes. 





Dictated by: 





  Dictated on workstation # [id number]








Dict:   10/01/22 1035


Trans:   10/01/22 1324


ACB [id number]





Interpreted by:     BELLA JIMENEZ MD


Electronically signed by: BELLA JIMENEZ MD 10/01/22 1324





Assessment/Plan


Pancreatitis likely secondary to stone


   lipase 2290, confirmed inflammation on CT abd/pelvis


Cholelithiasis


   gallbladder us positive for cholelithiasis


Transaminitis


Hyperglycemia


UTI





Plan:


Pt is on IV fluids bumped to 250ml/hr and pain medication, when the lipase and 

pain are decreased cholecystectomy is recommended to prevent further episodes of

gallstone pancreatitis. Dr. Ardon has started sliding scale insulin for 

hyperglycemia.





I personally reviewed the CT myself and discussed case with ED physician. Plan 

to remove gallbladder when pain is gone and lipase is coming down, must remain 

NPO until then.





Supervisory-Addendum Brief


Verification & Attestation


Participated in pt care:  history, MDM, physical


Personally performed:  exam, history, MDM, supervision of care


Care discussed with:  Medical Student


Procedures:  n/a


Verification and Attestation of Medical Student E/M Service





A medical student performed and documented this service. I then reviewed and 

verified all information documented by the medical student and made 

modifications to such information, when appropriate. I personally performed a 

physical exam, medical decision making and then discussed any differences 

between the notes and made revisions as necessary to create one note.





Itz Bucio , 10/1/22 , 13:47











KISHA MARTÍNEZ                    Oct 1, 2022 12:55


ITZ BUCIO DO                Oct 1, 2022 13:35

## 2022-10-01 NOTE — DIAGNOSTIC IMAGING REPORT
PROCEDURE: US Gallbladder.



TECHNIQUE: Multiple real-time grayscale images were obtained over

the right upper quadrant in various projections.



INDICATION: Right upper quadrant pain.



COMPARISON: None.



DISCUSSION: Sonographic evaluation of the right upper quadrant

was performed. The liver appears normal in echotexture and size.

No hepatic mass identified. Numerous layering stones are noted

within the dependent gallbladder. No gallbladder wall thickening

or pericholecystic fluid. No evidence of intrahepatic biliary

ductal dilatation. Common bile duct and pancreas are mostly

obscured due to overlying bowel gas. The right kidney appears

normal in echotexture and size without evidence of hydronephrosis

or renal mass. The right kidney measures 10.8 cm. The IVC appears

within normal limits. There is no ascites or abnormal bowel loops

identified. No sonographic Jean sign was reported.



IMPRESSION:

1. Cholelithiasis. No secondary inflammatory changes. 



Dictated by: 



  Dictated on workstation # [id number]

## 2022-10-01 NOTE — HISTORY & PHYSICAL-HOSPITALIST
History of Present Illness


HPI/Chief Complaint


CC: Pancreatitis with abnormal gallbladder





HPI: This is a 37yo[place] who has a h/o DM who presents to 

the ER with abdominal pain and was found to have pancreatitis with abnormal 

gallbladder. Dr Ramirez requests her to stabilize and then choly will be 

necessary. IVF and pain meds and PPI initiated. Language barrier limits details.


Source:  patient, family, RN/MD, old records


Exam Limitations:  language barrier


Date Seen


10/1/22


Time Seen by a Provider:  17:15


Attending Physician


[facility]


PCP


Admitting Physician:


 








Attending Physician:


Referring Physician





Date of Admission








Home Medications & Allergies


Home Medications


Reviewed patient Home Medication Reconciliation performed by pharmacy medication

reconciliations technician and/or nursing.


Patients Allergies have been reviewed.





Allergies





Allergies


Coded Allergies


  No Known Drug Allergies (Unverified9/26/22)








Past Medical-Social-Family Hx


Patient Social History


Marrital Status:  single


Employed/Student:  unemployed


Smoking Status:  Never a Smoker





Immunizations Up To Date


First/Initial COVID19 Vaccinat:  NONE


Second COVID19 Vaccination James:  NONE





Current Status


Advance Directives:  No


Primary Language:  English


Preferred Spoken Language:  English





Past Medical History


Diabetes, Non-Insulin dep





Review of Systems


Constitutional:  see HPI


Gastrointestinal:  abdominal pain, loss of appetite, nausea, vomiting





Physical Exam


Physical Exam


Vital Signs





Vital Signs - First Documented








 10/1/22 10/1/22 10/1/22





 07:38 11:46 11:48


 


Temp 36.2  


 


Pulse 68  


 


Resp 16  


 


B/P (MAP) 112/62 (79)  


 


Pulse Ox 97  


 


O2 Delivery Room Air  


 


O2 Flow Rate   0.00


 


FiO2  21 





Capillary Refill : Less Than 3 Seconds


Height, Weight, BMI


Height: '"


Weight: lbs. oz. kg; 31.00 BMI


Method:


General Appearance:  No Apparent Distress, Chronically ill, Obese


Eyes:  Right Eye Normal Inspection, Right Eye PERRL


HEENT:  PERRL/EOMI, Normal ENT Inspection, Pharynx Normal, Moist Mucous 

Membranes


Neck:  Full Range of Motion, Normal Inspection, Non Tender


Respiratory:  Chest Non Tender, Lungs Clear, Normal Breath Sounds, No Accessory 

Muscle Use, No Respiratory Distress


Cardiovascular:  Regular Rate, Rhythm, No Edema, No Gallop, No JVD, No Murmur, 

Normal Peripheral Pulses


Gastrointestinal:  Normal Bowel Sounds, No Organomegaly, No Pulsatile Mass, 

Soft, Tenderness


Back:  Normal Inspection, No CVA Tenderness, No Vertebral Tenderness


Extremity:  Normal Capillary Refill, Normal Inspection, Normal Range of Motion, 

Non Tender, No Calf Tenderness, No Pedal Edema


Neurologic/Psychiatric:  Alert, Oriented x3, No Motor/Sensory Deficits, Normal 

Mood/Affect


Skin:  Normal Color, Warm/Dry


Lymphatic:  No Adenopathy





Results


Results/Procedures


Labs


Laboratory Tests


10/1/22 07:50








Patient resulted labs reviewed.





Assessment/Plan


Admission Diagnosis


Assessment:


Acute pancreatitis


Abnormal gallbladder on scans


DM


Elevated LFT's





Plan:


Pain meds


IVF


Monitor closely


Admission Status:  Inpatient Order (span 2 midnights)


Reason for Inpatient Admission:  


pancreatitis











MARIUM SCHAEFFER DO                 Oct 1, 2022 11:03

## 2022-10-01 NOTE — DIAGNOSTIC IMAGING REPORT
PROCEDURE: CT abdomen and pelvis without contrast.



TECHNIQUE: Multiple contiguous axial images were obtained through

the abdomen and pelvis without the use of intravenous contrast.

Auto Exposure Controls were utilized during the CT exam to meet

ALARA standards for radiation dose reduction. 



INDICATION: Severe upper abdominal pain for one week.



COMPARISON: None.



DISCUSSION: Minimal atelectasis noted within the lung bases.

Normal heart size. No pleural or pericardial fluid. There are

mild inflammatory changes within the central mesentery within the

upper abdomen. These appear to also involve the pancreatic body

and, to a lesser degree, the head. Findings could be seen with

acute uncomplicated pancreatitis in the appropriate clinical

setting. Cholelithiasis is present. The liver, stomach, spleen,

and adrenal glands are unremarkable. No renal stone or

hydronephrosis. The aorta is normal in caliber. No evidence for

appendicitis. Bladder is decompressed. The uterus is

unremarkable. No obstruction or pneumatosis. No constipation. No

osseous abnormality identified.



IMPRESSION:

1. Inflammatory changes noted involving the pancreatic body and

extending into the central mesenteric region, most likely

represents acute uncomplicated pancreatitis. Recommend clinical

correlation with lipase levels.

2. Cholelithiasis. 



Dictated by: 



  Dictated on workstation # [id number]

## 2022-10-02 VITALS — SYSTOLIC BLOOD PRESSURE: 117 MMHG | DIASTOLIC BLOOD PRESSURE: 60 MMHG

## 2022-10-02 VITALS — DIASTOLIC BLOOD PRESSURE: 69 MMHG | SYSTOLIC BLOOD PRESSURE: 109 MMHG

## 2022-10-02 VITALS — SYSTOLIC BLOOD PRESSURE: 100 MMHG | DIASTOLIC BLOOD PRESSURE: 53 MMHG

## 2022-10-02 VITALS — SYSTOLIC BLOOD PRESSURE: 121 MMHG | DIASTOLIC BLOOD PRESSURE: 62 MMHG

## 2022-10-02 VITALS — SYSTOLIC BLOOD PRESSURE: 118 MMHG | DIASTOLIC BLOOD PRESSURE: 60 MMHG

## 2022-10-02 LAB
ALBUMIN SERPL-MCNC: 3.1 GM/DL (ref 3.2–4.5)
ALP SERPL-CCNC: 137 U/L (ref 40–136)
ALT SERPL-CCNC: 138 U/L (ref 0–55)
BASOPHILS # BLD AUTO: 0 10^3/UL (ref 0–0.1)
BASOPHILS NFR BLD AUTO: 0 % (ref 0–10)
BILIRUB SERPL-MCNC: 0.8 MG/DL (ref 0.1–1)
BUN/CREAT SERPL: 12
CALCIUM SERPL-MCNC: 8 MG/DL (ref 8.5–10.1)
CHLORIDE SERPL-SCNC: 107 MMOL/L (ref 98–107)
CO2 SERPL-SCNC: 22 MMOL/L (ref 21–32)
CREAT SERPL-MCNC: 0.76 MG/DL (ref 0.6–1.3)
EOSINOPHIL # BLD AUTO: 0.4 10^3/UL (ref 0–0.3)
EOSINOPHIL NFR BLD AUTO: 4 % (ref 0–10)
GFR SERPLBLD BASED ON 1.73 SQ M-ARVRAT: 103 ML/MIN
GLUCOSE SERPL-MCNC: 159 MG/DL (ref 70–105)
HCT VFR BLD CALC: 33 % (ref 35–52)
HGB BLD-MCNC: 10.6 G/DL (ref 11.5–16)
LIPASE SERPL-CCNC: 92 U/L (ref 8–78)
LYMPHOCYTES # BLD AUTO: 1.8 10^3/UL (ref 1–4)
LYMPHOCYTES NFR BLD AUTO: 19 % (ref 12–44)
MANUAL DIFFERENTIAL PERFORMED BLD QL: NO
MCH RBC QN AUTO: 27 PG (ref 25–34)
MCHC RBC AUTO-ENTMCNC: 32 G/DL (ref 32–36)
MCV RBC AUTO: 85 FL (ref 80–99)
MONOCYTES # BLD AUTO: 0.6 10^3/UL (ref 0–1)
MONOCYTES NFR BLD AUTO: 7 % (ref 0–12)
NEUTROPHILS # BLD AUTO: 6.5 10^3/UL (ref 1.8–7.8)
NEUTROPHILS NFR BLD AUTO: 69 % (ref 42–75)
PLATELET # BLD: 307 10^3/UL (ref 130–400)
PMV BLD AUTO: 10.4 FL (ref 9–12.2)
POTASSIUM SERPL-SCNC: 3.6 MMOL/L (ref 3.6–5)
PROT SERPL-MCNC: 6.6 GM/DL (ref 6.4–8.2)
SODIUM SERPL-SCNC: 135 MMOL/L (ref 135–145)
WBC # BLD AUTO: 9.4 10^3/UL (ref 4.3–11)

## 2022-10-02 RX ADMIN — METOCLOPRAMIDE SCH MG: 5 INJECTION, SOLUTION INTRAMUSCULAR; INTRAVENOUS at 17:14

## 2022-10-02 RX ADMIN — SENNOSIDES SCH MG: 8.6 TABLET, FILM COATED ORAL at 20:06

## 2022-10-02 RX ADMIN — INSULIN ASPART SCH UNIT: 100 INJECTION, SOLUTION INTRAVENOUS; SUBCUTANEOUS at 05:39

## 2022-10-02 RX ADMIN — DEXTROSE MONOHYDRATE AND SODIUM CHLORIDE SCH MLS/HR: 5; .9 INJECTION, SOLUTION INTRAVENOUS at 06:00

## 2022-10-02 RX ADMIN — INSULIN ASPART SCH UNIT: 100 INJECTION, SOLUTION INTRAVENOUS; SUBCUTANEOUS at 23:16

## 2022-10-02 RX ADMIN — DOCUSATE SODIUM SCH MG: 100 CAPSULE ORAL at 08:41

## 2022-10-02 RX ADMIN — METOCLOPRAMIDE SCH MG: 5 INJECTION, SOLUTION INTRAMUSCULAR; INTRAVENOUS at 23:14

## 2022-10-02 RX ADMIN — INSULIN ASPART SCH UNIT: 100 INJECTION, SOLUTION INTRAVENOUS; SUBCUTANEOUS at 11:46

## 2022-10-02 RX ADMIN — PANTOPRAZOLE SODIUM SCH MG: 40 INJECTION, POWDER, FOR SOLUTION INTRAVENOUS at 08:38

## 2022-10-02 RX ADMIN — INSULIN ASPART SCH UNIT: 100 INJECTION, SOLUTION INTRAVENOUS; SUBCUTANEOUS at 17:14

## 2022-10-02 RX ADMIN — METOCLOPRAMIDE SCH MG: 5 INJECTION, SOLUTION INTRAMUSCULAR; INTRAVENOUS at 06:00

## 2022-10-02 RX ADMIN — DOCUSATE SODIUM SCH MG: 100 CAPSULE ORAL at 20:06

## 2022-10-02 RX ADMIN — ENOXAPARIN SODIUM SCH MG: 100 INJECTION SUBCUTANEOUS at 11:22

## 2022-10-02 RX ADMIN — DEXTROSE MONOHYDRATE AND SODIUM CHLORIDE SCH MLS/HR: 5; .9 INJECTION, SOLUTION INTRAVENOUS at 14:53

## 2022-10-02 RX ADMIN — SENNOSIDES SCH MG: 8.6 TABLET, FILM COATED ORAL at 08:41

## 2022-10-02 RX ADMIN — METOCLOPRAMIDE SCH MG: 5 INJECTION, SOLUTION INTRAMUSCULAR; INTRAVENOUS at 11:50

## 2022-10-02 NOTE — PROGRESS NOTE - SURGERY
KISHA MARTÍNEZ 10/2/22 0821:


Subjective


Date Seen by a Provider:  Oct 2, 2022


Time Seen by a Provider:  08:16


Subjective/Events-last exam


Pt is lyng in bed and is responsive to my questions. She called a relative on 

the phone to translate. She reports improvement of epigastric pain, could not 

give a number scale, said she feels fine. She denied any pain radiation to the 

back today. Pt has been NPO for approximately 32 hours. She reports that she was

able to get up to go to the bathroom to urinate this AM, but cannot recall her 

last BM. She denies any other complaints such as N/V, chest pain, or shortness 

of breath.


Review of Systems


General:  No Chills, No Night Sweats


HEENT:  No Head Aches


Pulmonary:  No Dyspnea, No Cough


Cardiovascular:  No: Chest Pain, Orthopnea


Gastrointestinal:  No: Nausea, Vomiting


Musculoskeletal:  No: neck pain, back pain





Objective


Exam





Vital Signs








  Date Time  Temp Pulse Resp B/P (MAP) Pulse Ox O2 Delivery O2 Flow Rate FiO2


 


10/2/22 07:52      Room Air 0.00 


 


10/2/22 07:32 37.0 69 18 117/60 (79) 95 Room Air  


 


10/2/22 07:00  58      


 


10/2/22 03:31 37.2 69 18 109/69 (82) 95 Room Air  


 


10/2/22 01:00  61      


 


10/1/22 23:08 36.7 58 18 110/74 (86) 94 Room Air  


 


10/1/22 20:00      Room Air  


 


10/1/22 19:33 37.0 60 18 105/64 (78) 95 Room Air  


 


10/1/22 19:00  64      


 


10/1/22 17:00 36.8 58 18 103/65 (78) 97 Room Air  


 


10/1/22 12:33  53      


 


10/1/22 12:00     98 Room Air 0.00 


 


10/1/22 11:50 36.5 55 18 103/67 (79) 98 Room Air  


 


10/1/22 11:48     97 Room Air 0.00 


 


10/1/22 11:46 36.2 68   97   21


 


10/1/22 11:20  58 16 98/66 97 Room Air  














I & O 


 


 10/2/22





 07:00


 


Intake Total 2040 ml


 


Balance 2040 ml





Capillary Refill : Less Than 3 Seconds


General Appearance:  No Apparent Distress, Chronically ill, Obese


HEENT:  PERRL/EOMI, Moist Mucous Membranes; No Scleral Icterus (L), No Scleral 

Icterus (R)


Neck:  Non Tender, Supple


Respiratory:  Chest Non Tender, Lungs Clear, Normal Breath Sounds, No Accessory 

Muscle Use, No Respiratory Distress


Cardiovascular:  Regular Rate, Rhythm, No Murmur


Peripheral Pulses:  2+ Radial Pulses (R), 2+ Radial Pulses (L)


Gastrointestinal:  soft, tenderness (diffuse tenderness worse in epigastrium, 

much improved today- no pain), hernia (small umbilical)


Extremity:  Non Tender, No Calf Tenderness, No Pedal Edema


Neurologic/Psychiatric:  Alert, Oriented x3, Normal Mood/Affect; No Facial Droop


Skin:  Normal Color, Warm/Dry


Lymphatic:  No Adenopathy





Results


Lab


Laboratory Tests


10/1/22 08:23: 


Urine Color REDH, Urine Clarity SL CLOUDY, Urine pH 7.0, Urine Specific Gravity 

1.025H, Urine Protein 3+H, Urine Glucose (UA) TRACEH, Urine Ketones TRACEH, 

Urine Nitrite POSITIVEH, Urine Bilirubin NEGATIVE, Urine Urobilinogen >=8.0, 

Urine Leukocyte Esterase 1+H, Urine RBC (Auto) 3+H, Urine RBC TNTCH, Urine WBC 

10-25H, Urine Squamous Epithelial Cells 5-10, Urine Crystals NONE, Urine 

Bacteria LARGEH, Urine Casts NONE, Urine Mucus NEGATIVE, Urine Culture Indicated

YES


10/1/22 17:05: Glucometer 99


10/1/22 23:11: Glucometer 133H


10/2/22 04:58: 


White Blood Count 9.4, Red Blood Count 3.89, Hemoglobin 10.6L, Hematocrit 33L, 

Mean Corpuscular Volume 85, Mean Corpuscular Hemoglobin 27, Mean Corpuscular 

Hemoglobin Concent 32, Red Cell Distribution Width 12.1, Platelet Count 307, 

Mean Platelet Volume 10.4, Immature Granulocyte % (Auto) 0, Neutrophils (%) 

(Auto) 69, Lymphocytes (%) (Auto) 19, Monocytes (%) (Auto) 7, Eosinophils (%) 

(Auto) 4, Basophils (%) (Auto) 0, Neutrophils # (Auto) 6.5, Lymphocytes # (Auto)

1.8, Monocytes # (Auto) 0.6, Eosinophils # (Auto) 0.4H, Basophils # (Auto) 0.0, 

Immature Granulocyte # (Auto) 0.0, Sodium Level 135, Potassium Level 3.6, 

Chloride Level 107, Carbon Dioxide Level 22, Anion Gap 6, Blood Urea Nitrogen 9,

Creatinine 0.76, Estimat Glomerular Filtration Rate 103, BUN/Creatinine Ratio 

12, Glucose Level 159H, Calcium Level 8.0L, Corrected Calcium 8.7, Total 

Bilirubin 0.8, Aspartate Amino Transf (AST/SGOT) 66H, Alanine Aminotransferase 

(ALT/SGPT) 138H, Alkaline Phosphatase 137H, Total Protein 6.6, Albumin 3.1L, 

Lipase 92H





Assessment/Plan


Pancreatitis likely secondary to stone


   lipase 2290, down to 92 today. confirmed inflammation on CT abd/pelvis


Cholelithiasis


   gallbladder us positive for cholelithiasis


Transaminitis


   improvement in AST, ALT, Alk phos today


Hyperglycemia


   159 this morning


UTI





Plan:


Pt is on IV 250ml/hr and pain medication, Lipase and pain levels are both 

decreased today, NPO for 32 hrs, will consider timing of cholecystectomy to 

prevent future episodes of gallstone pancreatitis. Sliding scale insulin for 

hyperglycemia. Improvement in liver enzymes this morning.





I personally reviewed the CT myself and discussed case with ED physician. Plan 

to remove gallbladder when pain is gone and lipase is coming down, must remain 

NPO until then.





BOSTON RAMIREZ DO 10/2/22 1354:


Subjective


Time Seen by a Provider:  12:29


Subjective/Events-last exam


Pt seen and examined, she called her friend to interpret.  Pt is not having any 

pain and is hungry.


Review of Systems


General:  No Chills, No Night Sweats


HEENT:  No Head Aches


Pulmonary:  No Dyspnea, No Cough


Cardiovascular:  No: Chest Pain, Orthopnea


Gastrointestinal:  No: Nausea, Vomiting


Musculoskeletal:  No: neck pain, back pain





Objective


Exam


General Appearance:  No Apparent Distress, Obese


HEENT:  PERRL/EOMI, Moist Mucous Membranes; No Scleral Icterus (L), No Scleral 

Icterus (R)


Respiratory:  Lungs Clear, Normal Breath Sounds, No Accessory Muscle Use, No 

Respiratory Distress


Cardiovascular:  Regular Rate, Rhythm, No Murmur


Gastrointestinal:  non tender, soft, no organomegaly, hernia (small umbilical)


Extremity:  No Calf Tenderness, No Pedal Edema


Neurologic/Psychiatric:  Alert, Oriented x3, Normal Mood/Affect


Skin:  Normal Color, Warm/Dry





Assessment/Plan


Pancreatitis likely secondary to cholelithiasis


   lipase 2290, down to 92 today. confirmed inflammation on CT abd/pelvis


Cholelithiasis


   gallbladder us positive for cholelithiasis


Transaminitis


   improvement in AST, ALT, Alk phos today


Hyperglycemia


   159 this morning


UTI





Plan:


Pt is to decrease IV fluids to 150ml/hr, pain medication as needed and will try 

clears today.  Lipase and pain levels are both decreased today, if she tolerates

clears without pain will plan on cholecystectomy tomorrow afternoon.  Discussed 

the procedure with pt; risks and complications not limited to pain, bleeding, 

infection, scar, damage to bowel or bile ducts and need for further procedure.  

All questions answered to her satisfaction.  Will make her NPO after midnight 

and get consent for Laparoscopic Cholecystectomy with possible cholangiogram, 

possible open and all other indicated procedures.





Supervisory-Addendum Brief


Verification & Attestation


Participated in pt care:  history, MDM, physical


Personally performed:  exam, history, MDM, supervision of care


Care discussed with:  Medical Student


Procedures:  n/a


Verification and Attestation of Medical Student E/M Service





A medical student performed and documented this service. I then reviewed and 

verified all information documented by the medical student and made 

modifications to such information, when appropriate. I personally performed a 

physical exam, medical decision making and then discussed any differences 

between the notes and made revisions as necessary to create one note.





Boston Ramirez , 10/2/22 , 13:54











KISHA MARTÍNEZ                    Oct 2, 2022 08:21


BOSTON RAMIREZ DO                Oct 2, 2022 13:54

## 2022-10-02 NOTE — PROGRESS NOTE - HOSPITALIST
Subjective


HPI/CC On Admission


Date Seen by Provider:  Oct 2, 2022


Time Seen by Provider:  10:00


CC: Pancreatitis with abnormal gallbladder





HPI: This is a 37yoF from the [place] who has a h/o DM who presents to 

the ER with abdominal pain and was found to have pancreatitis with abnormal 

gallbladder. Dr Ramirez requests her to stabilize and then choly will be 

necessary. IVF and pain meds and PPI initiated. Language barrier limits details.


Subjective/Events-last exam


Doing well


Less pain


Increased hunger


Lipase trended down


No nausea





Review of Systems


General:  Fatigue, Malaise





Objective


Exam


Vital Signs





Vital Signs








  Date Time  Temp Pulse Resp B/P (MAP) Pulse Ox O2 Delivery O2 Flow Rate FiO2


 


10/2/22 15:13 36.8 60 18 100/53 (69) 97 Room Air  


 


10/2/22 07:52       0.00 


 


10/1/22 11:46        21





Capillary Refill : Less Than 3 Seconds


General Appearance:  No Apparent Distress, WD/WN, Chronically ill, Obese


Respiratory:  Lungs Clear, Normal Breath Sounds


Cardiovascular:  Regular Rate, Rhythm


Neurologic/Psychiatric:  Alert, Oriented x3, No Motor/Sensory Deficits, Normal 

Mood/Affect





Results/Procedures


Lab


Laboratory Tests


10/2/22 04:58








Patient resulted labs reviewed.





Assessment/Plan


Assessment and Plan


Assess & Plan/Chief Complaint


Assessment:


Acute pancreatitis


Abnormal gallbladder on scans


DM


Elevated LFT's





Plan:


Pain meds


IVF


Monitor closely











MARIUM SCHAEFFER DO                 Oct 2, 2022 07:56

## 2022-10-03 VITALS — SYSTOLIC BLOOD PRESSURE: 114 MMHG | DIASTOLIC BLOOD PRESSURE: 56 MMHG

## 2022-10-03 VITALS — DIASTOLIC BLOOD PRESSURE: 52 MMHG | SYSTOLIC BLOOD PRESSURE: 100 MMHG

## 2022-10-03 VITALS — SYSTOLIC BLOOD PRESSURE: 110 MMHG | DIASTOLIC BLOOD PRESSURE: 69 MMHG

## 2022-10-03 VITALS — SYSTOLIC BLOOD PRESSURE: 109 MMHG | DIASTOLIC BLOOD PRESSURE: 57 MMHG

## 2022-10-03 VITALS — DIASTOLIC BLOOD PRESSURE: 57 MMHG | SYSTOLIC BLOOD PRESSURE: 114 MMHG

## 2022-10-03 VITALS — DIASTOLIC BLOOD PRESSURE: 54 MMHG | SYSTOLIC BLOOD PRESSURE: 105 MMHG

## 2022-10-03 VITALS — SYSTOLIC BLOOD PRESSURE: 115 MMHG | DIASTOLIC BLOOD PRESSURE: 60 MMHG

## 2022-10-03 VITALS — SYSTOLIC BLOOD PRESSURE: 107 MMHG | DIASTOLIC BLOOD PRESSURE: 63 MMHG

## 2022-10-03 VITALS — DIASTOLIC BLOOD PRESSURE: 53 MMHG | SYSTOLIC BLOOD PRESSURE: 102 MMHG

## 2022-10-03 VITALS — DIASTOLIC BLOOD PRESSURE: 73 MMHG | SYSTOLIC BLOOD PRESSURE: 112 MMHG

## 2022-10-03 VITALS — DIASTOLIC BLOOD PRESSURE: 78 MMHG | SYSTOLIC BLOOD PRESSURE: 105 MMHG

## 2022-10-03 VITALS — SYSTOLIC BLOOD PRESSURE: 111 MMHG | DIASTOLIC BLOOD PRESSURE: 62 MMHG

## 2022-10-03 VITALS — DIASTOLIC BLOOD PRESSURE: 65 MMHG | SYSTOLIC BLOOD PRESSURE: 119 MMHG

## 2022-10-03 VITALS — DIASTOLIC BLOOD PRESSURE: 57 MMHG | SYSTOLIC BLOOD PRESSURE: 103 MMHG

## 2022-10-03 LAB
ALBUMIN SERPL-MCNC: 3.3 GM/DL (ref 3.2–4.5)
ALP SERPL-CCNC: 143 U/L (ref 40–136)
ALT SERPL-CCNC: 100 U/L (ref 0–55)
BASOPHILS # BLD AUTO: 0 10^3/UL (ref 0–0.1)
BASOPHILS NFR BLD AUTO: 0 % (ref 0–10)
BILIRUB SERPL-MCNC: 0.6 MG/DL (ref 0.1–1)
BUN/CREAT SERPL: 9
CALCIUM SERPL-MCNC: 8.5 MG/DL (ref 8.5–10.1)
CHLORIDE SERPL-SCNC: 106 MMOL/L (ref 98–107)
CO2 SERPL-SCNC: 23 MMOL/L (ref 21–32)
CREAT SERPL-MCNC: 0.78 MG/DL (ref 0.6–1.3)
EOSINOPHIL # BLD AUTO: 0.5 10^3/UL (ref 0–0.3)
EOSINOPHIL NFR BLD AUTO: 7 % (ref 0–10)
GFR SERPLBLD BASED ON 1.73 SQ M-ARVRAT: 100 ML/MIN
GLUCOSE SERPL-MCNC: 157 MG/DL (ref 70–105)
HCT VFR BLD CALC: 34 % (ref 35–52)
HGB BLD-MCNC: 10.9 G/DL (ref 11.5–16)
LIPASE SERPL-CCNC: 61 U/L (ref 8–78)
LYMPHOCYTES # BLD AUTO: 1.9 10^3/UL (ref 1–4)
LYMPHOCYTES NFR BLD AUTO: 26 % (ref 12–44)
MANUAL DIFFERENTIAL PERFORMED BLD QL: NO
MCH RBC QN AUTO: 27 PG (ref 25–34)
MCHC RBC AUTO-ENTMCNC: 32 G/DL (ref 32–36)
MCV RBC AUTO: 84 FL (ref 80–99)
MONOCYTES # BLD AUTO: 0.5 10^3/UL (ref 0–1)
MONOCYTES NFR BLD AUTO: 7 % (ref 0–12)
NEUTROPHILS # BLD AUTO: 4.3 10^3/UL (ref 1.8–7.8)
NEUTROPHILS NFR BLD AUTO: 60 % (ref 42–75)
PLATELET # BLD: 337 10^3/UL (ref 130–400)
PMV BLD AUTO: 10 FL (ref 9–12.2)
POTASSIUM SERPL-SCNC: 3.6 MMOL/L (ref 3.6–5)
PROT SERPL-MCNC: 7.1 GM/DL (ref 6.4–8.2)
SODIUM SERPL-SCNC: 135 MMOL/L (ref 135–145)
WBC # BLD AUTO: 7.3 10^3/UL (ref 4.3–11)

## 2022-10-03 RX ADMIN — ENOXAPARIN SODIUM SCH MG: 100 INJECTION SUBCUTANEOUS at 11:06

## 2022-10-03 RX ADMIN — DOCUSATE SODIUM SCH MG: 100 CAPSULE ORAL at 08:28

## 2022-10-03 RX ADMIN — DEXTROSE MONOHYDRATE AND SODIUM CHLORIDE SCH MLS/HR: 5; .9 INJECTION, SOLUTION INTRAVENOUS at 04:02

## 2022-10-03 RX ADMIN — SODIUM CHLORIDE, SODIUM LACTATE, POTASSIUM CHLORIDE, AND CALCIUM CHLORIDE PRN MLS/HR: 600; 310; 30; 20 INJECTION, SOLUTION INTRAVENOUS at 12:05

## 2022-10-03 RX ADMIN — INSULIN ASPART SCH UNIT: 100 INJECTION, SOLUTION INTRAVENOUS; SUBCUTANEOUS at 11:11

## 2022-10-03 RX ADMIN — DOCUSATE SODIUM SCH MG: 100 CAPSULE ORAL at 20:49

## 2022-10-03 RX ADMIN — INSULIN ASPART SCH UNIT: 100 INJECTION, SOLUTION INTRAVENOUS; SUBCUTANEOUS at 17:48

## 2022-10-03 RX ADMIN — INSULIN ASPART SCH UNIT: 100 INJECTION, SOLUTION INTRAVENOUS; SUBCUTANEOUS at 05:27

## 2022-10-03 RX ADMIN — SENNOSIDES SCH MG: 8.6 TABLET, FILM COATED ORAL at 20:49

## 2022-10-03 RX ADMIN — PANTOPRAZOLE SODIUM SCH MG: 40 INJECTION, POWDER, FOR SOLUTION INTRAVENOUS at 08:34

## 2022-10-03 RX ADMIN — METOCLOPRAMIDE SCH MG: 5 INJECTION, SOLUTION INTRAMUSCULAR; INTRAVENOUS at 05:27

## 2022-10-03 RX ADMIN — DEXTROSE MONOHYDRATE AND SODIUM CHLORIDE SCH MLS/HR: 5; .9 INJECTION, SOLUTION INTRAVENOUS at 15:17

## 2022-10-03 RX ADMIN — METOCLOPRAMIDE SCH MG: 5 INJECTION, SOLUTION INTRAMUSCULAR; INTRAVENOUS at 17:51

## 2022-10-03 RX ADMIN — SENNOSIDES SCH MG: 8.6 TABLET, FILM COATED ORAL at 08:28

## 2022-10-03 RX ADMIN — METOCLOPRAMIDE SCH MG: 5 INJECTION, SOLUTION INTRAMUSCULAR; INTRAVENOUS at 11:21

## 2022-10-03 RX ADMIN — SODIUM CHLORIDE, SODIUM LACTATE, POTASSIUM CHLORIDE, AND CALCIUM CHLORIDE PRN MLS/HR: 600; 310; 30; 20 INJECTION, SOLUTION INTRAVENOUS at 13:00

## 2022-10-03 NOTE — DIAGNOSTIC IMAGING REPORT
INDICATION: Cholelithiasis, undergoing cholecystectomy.



FINDINGS: Single spot as well as a cine intraoperative

cholangiogram image is are submitted. There is cannulation of the

extra hepatic biliary tree. Images demonstrate contrast

opacification of the biliary system. Biliary tree is not

significantly dilated. There are two small rounded filling

defects demonstrated in the low common bile duct. There is

migration of contrast around these and spillage into the

duodenum. High-degree obstruction does not appear to be present.



IMPRESSION: Intraoperative cholangiogram demonstrates a

nondilated extrahepatic biliary tree. However, there are two

small rounded filling defects in low common bile duct.

Indeterminate whether these are potential air bubbles versus

small gallstones.



Fluoroscopic time: 20.3 seconds.



Dictated by: 



  Dictated on workstation # [id number]

## 2022-10-03 NOTE — PROGRESS NOTE - SURGERY
KISHA MARTÍNEZ 10/3/22 0712:


Subjective


Date Seen by a Provider:  Oct 3, 2022


Time Seen by a Provider:  07:07


Subjective/Events-last exam


Pt is lying comfortably in bed with no complaints of pain. She was unable to get

a hold of the  on the phone, so the language barrier may have interfer

red with getting her events from overnight. She reports no abdominal pain or 

fever. She denied any other complaints of N/V. Reportedly voided and had a BM 

this morning. I was unable to obtain if she had eaten or not, but she was made 

npo for surgery last night.


Review of Systems


General:  No Chills, No Night Sweats


HEENT:  No Head Aches, No Visual Changes


Pulmonary:  No Dyspnea, No Cough


Cardiovascular:  No: Chest Pain, Palpitations


Gastrointestinal:  No: Nausea, Vomiting, Abdominal Pain


Genitourinary:  No Dysuria, No Hematuria


Neurological:  No: Change in speech





Objective


Exam





Vital Signs








  Date Time  Temp Pulse Resp B/P (MAP) Pulse Ox O2 Delivery O2 Flow Rate FiO2


 


10/3/22 03:55  49      


 


10/3/22 03:41 36.6 82 16 107/63 (78) 97 Room Air  


 


10/3/22 01:00  56      


 


10/3/22 00:03 36.7 72 16 115/60 (78) 97 Room Air  


 


10/2/22 20:05      Room Air  


 


10/2/22 19:15 37.0 57 18 118/60 (79) 99 Room Air  


 


10/2/22 19:00  56      


 


10/2/22 15:13 36.8 60 18 100/53 (69) 97 Room Air  


 


10/2/22 12:34  58      


 


10/2/22 11:08 37.0 71 18 121/62 (81) 96 Room Air  


 


10/2/22 08:00     95 Room Air  


 


10/2/22 07:52      Room Air 0.00 


 


10/2/22 07:32 37.0 69 18 117/60 (79) 95 Room Air  














I & O 


 


 10/3/22





 07:00


 


Intake Total 2300 ml


 


Output Total 750 ml


 


Balance 1550 ml





Capillary Refill : Less Than 3 Seconds


General Appearance:  No Apparent Distress, WD/WN, Obese


HEENT:  PERRL/EOMI, Moist Mucous Membranes; No Scleral Icterus (L), No Scleral 

Icterus (R)


Neck:  Non Tender, Supple; No Lymphadenopathy (L), No Lymphadenopathy (R)


Respiratory:  Lungs Clear, Normal Breath Sounds; No Wheezing


Cardiovascular:  Regular Rate, Rhythm, No Murmur


Peripheral Pulses:  2+ Radial Pulses (R), 2+ Radial Pulses (L)


Gastrointestinal:  non tender, soft, no organomegaly, hernia (small umbilical)


Extremity:  No Calf Tenderness, No Pedal Edema


Neurologic/Psychiatric:  Alert, Oriented x3, Normal Mood/Affect


Skin:  Normal Color, Warm/Dry


Lymphatic:  No Adenopathy (cervical)





Results


Lab


Laboratory Tests


10/2/22 11:05: Glucometer 147H


10/2/22 17:10: Glucometer 134H


10/2/22 23:15: Glucometer 133H


10/3/22 05:10: 


White Blood Count 7.3, Red Blood Count 4.07, Hemoglobin 10.9L, Hematocrit 34L, 

Mean Corpuscular Volume 84, Mean Corpuscular Hemoglobin 27, Mean Corpuscular 

Hemoglobin Concent 32, Red Cell Distribution Width 11.8, Platelet Count 337, 

Mean Platelet Volume 10.0, Immature Granulocyte % (Auto) 0, Neutrophils (%) 

(Auto) 60, Lymphocytes (%) (Auto) 26, Monocytes (%) (Auto) 7, Eosinophils (%) 

(Auto) 7, Basophils (%) (Auto) 0, Neutrophils # (Auto) 4.3, Lymphocytes # (Auto)

1.9, Monocytes # (Auto) 0.5, Eosinophils # (Auto) 0.5H, Basophils # (Auto) 0.0, 

Immature Granulocyte # (Auto) 0.0, Sodium Level 135, Potassium Level 3.6, Chlor

kevin Level 106, Carbon Dioxide Level 23, Anion Gap 6, Blood Urea Nitrogen 7, 

Creatinine 0.78, Estimat Glomerular Filtration Rate 100, BUN/Creatinine Ratio 9,

Glucose Level 157H, Calcium Level 8.5, Corrected Calcium 9.1, Total Bilirubin 

0.6, Aspartate Amino Transf (AST/SGOT) 31, Alanine Aminotransferase (ALT/SGPT) 

100H, Alkaline Phosphatase 143H, Total Protein 7.1, Albumin 3.3, Lipase 61


10/3/22 05:19: Glucometer 153H





Microbiology


10/1/22 Urine Culture - Final, Complete


          Gram Pos Mixed Bacterial Gayarti





Assessment/Plan


Pancreatitis likely secondary to cholelithiasis


   lipase 2290, down to 61. confirmed inflammation on CT abd/pelvis


Cholelithiasis


   gallbladder us positive for cholelithiasis


Transaminitis


   improvement in AST, ALT, Alk phos increased to 143 from 137 yesterday


Hyperglycemia


   153 this morning


UTI





Plan:


Decreasing lipase levels and no pain on exam. Planning on surgery this afternoon

to prevent further episodes of gallstone pancreatitis. Dr. Ramirez discussed the 

procedure with pt; risks and complications not limited to pain, bleeding, 

infection, scar, damage to bowel or bile ducts and need for further procedure.  

All questions answered to her satisfaction.  Ordered NPO after midnight and get 

consent for Laparoscopic Cholecystectomy with possible cholangiogram, possible 

open and all other indicated procedures.





ITZ RAMIREZ DO 10/3/22 1217:


Subjective


Time Seen by a Provider:  11:39


Subjective/Events-last exam


Pt seen and examined, no changes and denies pain.  Tolerating clears without 

problems.


Review of Systems


Cardiovascular:  No: Chest Pain, Palpitations


Gastrointestinal:  No: Nausea, Vomiting, Abdominal Pain





Objective


Exam


General Appearance:  No Apparent Distress, Obese


Respiratory:  Lungs Clear, Normal Breath Sounds


Cardiovascular:  Regular Rate, Rhythm, No Murmur


Gastrointestinal:  non tender, soft, no organomegaly, hernia (small umbilical)





Assessment/Plan


Cholelithiasis - plan Lap mercedes with IOC today


Pancreatitis - resolved


Transaminitis


Hyperglycemia - 153 this morning


UTI





Plan to OR today for Lap mercedes, consent signed, pt with no questions.





Supervisory-Addendum Brief


Verification & Attestation


Participated in pt care:  history, MDM, physical


Personally performed:  exam, history, MDM, supervision of care


Care discussed with:  Medical Student


Procedures:  n/a


Verification and Attestation of Medical Student E/M Service





A medical student performed and documented this service. I then reviewed and 

verified all information documented by the medical student and made 

modifications to such information, when appropriate. I personally performed a 

physical exam, medical decision making and then discussed any differences 

between the notes and made revisions as necessary to create one note.





Itz Ramirez , 10/3/22 , 12:17











KISHA MARTÍNEZ                    Oct 3, 2022 07:12


ITZ RAMIREZ DO                Oct 3, 2022 12:17

## 2022-10-03 NOTE — ANESTHESIA-GENERAL POST-OP
General


Patient Condition


Mental Status/LOC:  Same as Preop


Cardiovascular:  Satisfactory


Nausea/Vomiting:  Absent


Respiratory:  Satisfactory


Pain:  Controlled


Complications:  Absent





Post Op Complications


Complications


None





Follow Up Care/Instructions


Patient Instructions


None needed.





Anesthesia/Patient Condition


Patient Condition


Patient is doing well, no complaints, stable vital signs, no apparent adverse 

anesthesia problems.   


No complications reported per nursing.











DEYA LOPEZ CRNA             Oct 3, 2022 14:02

## 2022-10-03 NOTE — PROGRESS NOTE - HOSPITALIST
HARSHALFONZO 10/3/22 1155:


Subjective


HPI/CC On Admission


Date Seen by Provider:  Oct 3, 2022


Time Seen by Provider:  11:50


CC: Pancreatitis with abnormal gallbladder








HPI: This is a 37yoF h/o DM who presents to the ER with abdominal pain and was 

found to have pancreatitis with abnormal gallbladder. Patient is laying 

comfortably in bed. Encounter limited by language barrier, patient has relative 

translating through the phone. Pt denies any current abd pain, nausea, or 

vomiting. Pt's lipase and WBC have improved from yesterday. Pt is scheduled for 

a cholescytecomy today.





Hospital Course:


36 yo F presented to the ED on 10/1 with c/o 10/10 diffuse postprandial abd pain

that radiated to her chest that started 1 week ago and has been worsening. Pt 

had a CT ABD/Pelvis on 10/1 that showed acute uncomplicated pancreatitis with 

cholelithiasis. Pt was admitted for acute pancreatitis and cholelithiasis. Work 

up found an elevated lipase, WBC, and AST/ALT and ALP. Pt was placed on IVF, 

oxycodone (10mg prn), hydromorphone (0.5mg prn), and pantoprazole (40mg) to 

stabilize for cholescytecomy today. Pt's lipase, WBC, AST/ALT and ALP have all 

trended down and patient currently denies any pain.





Review of Systems


General:  No Chills, No Night Sweats


HEENT:  No Head Aches, No Visual Changes


Pulmonary:  No Dyspnea, No Cough


Cardiovascular:  No: Chest Pain, Palpitations


Gastrointestinal:  No: Nausea, Vomiting, Abdominal Pain


Genitourinary:  No Dysuria, No Frequency


Musculoskeletal:  No: neck pain, shoulder pain


Neurological:  No: Weakness, Numbness





Objective


Exam


Vital Signs





Vital Signs








  Date Time  Temp Pulse Resp B/P (MAP) Pulse Ox O2 Delivery O2 Flow Rate FiO2


 


10/3/22 11:31 36.6 53 18 111/62 (78) 97 Room Air  


 


10/3/22 08:00       0.00 


 


10/1/22 11:46        21





Capillary Refill : Less Than 3 Seconds


General Appearance:  No Apparent Distress, WD/WN


HEENT:  PERRL/EOMI, Moist Mucous Membranes


Neck:  Full Range of Motion, Normal Inspection


Respiratory:  Lungs Clear, Normal Breath Sounds, No Respiratory Distress


Cardiovascular:  Regular Rate, Rhythm, No Edema


Gastrointestinal:  Normal Bowel Sounds, Non Tender, Soft


Back:  Normal Inspection, No CVA Tenderness


Extremity:  Normal Inspection, No Calf Tenderness


Neurologic/Psychiatric:  Alert, Normal Mood/Affect


Skin:  Normal Color, Warm/Dry


Lymphatic:  No Adenopathy





Results/Procedures


Lab


Laboratory Tests


10/3/22 05:10








Patient resulted labs reviewed.





Assessment/Plan


Assessment and Plan


Assess & Plan/Chief Complaint


Acute uncomplicated pancreatitis with cholelithiasis 


Abd pain 


DM


Elevated LFTs





Patient scheduled for cholescytectomy today 


Possible discharge following surgery





TESSA SCHAEFFER DO 10/4/22 0518:


Subjective


Subjective/Events-last exam


Sent for cholecystectomy


Spoke with family member on phone for translation


Patient doing really well


Review of Systems


General:  Fatigue, Malaise





Objective


Exam


General Appearance:  No Apparent Distress, WD/WN


Respiratory:  Lungs Clear, Normal Breath Sounds


Cardiovascular:  Regular Rate, Rhythm


Neurologic/Psychiatric:  Alert, Oriented x3





Assessment/Plan


Assessment and Plan


Assess & Plan/Chief Complaint


Cholecystectomy





Supervisory-Addendum Brief


Verification & Attestation


Participated in pt care:  history, MDM, physical


Personally performed:  exam, history, MDM, supervision of care


Care discussed with:  Medical Student


Procedures:  n/a


Results interpretation:  Verified all documentation


Verification and Attestation of Medical Student E/M Service





A medical student performed and documented this service in my presence. I 

reviewed and verified all information documented by the medical student and made

modifications to such information, when appropriate. I personally performed the 

physical exam and medical decision making. 





 Tessa Schaeffer Oct 4, 2022,05:17











ALFONZO HOUSE                Oct 3, 2022 11:55


TESSA SCHAEFFER DO                 Oct 4, 2022 05:18

## 2022-10-03 NOTE — PROGRESS NOTE-POST OPERATIVE
Post-Operative Progess Note


Surgeon (s)/Assistant (s)


Surgeon


ITZ BUCIO DO


Assistant:  ADRIA Shrestha





Pre-Operative Diagnosis


Acute cholecystitis/Cholelithiasis with pancreatitis





Post-Operative Diagnosis





same





Procedure & Operative Findings


Date of Procedure


10/3/22


Procedure Performed/Findings


PROCEDURE: Laparoscopic cholecystectomy with intraoperative cholangiogram. 


 


COMPLICATIONS: None. 


 


PROCEDURE:


The patient was taken to the operating suite and was prepped and draped in 


sterile fashion. A surgical pause was performed. Just superior to the umbilicus,




a 12 mm incision was made. Dissection was taken down to the fascia, which was 


then scored and grasped with a Kocher and the abdomen was then entered.  An 0 

Vicryl


suture was placed in a figure-of-eight fashion and a Cisneros trocar was placed 

and 


secured. Pneumoperitoneum was achieved. A 5mm trochar place in the subxyphoid 

and 


2 in the right upper quadrant. Immediately upon entering noted erythema on the 


gallbladder and it was distended; then noted large stones.  The gallbladder was 

then 


grasped at the fundus and elevated in the superior direction.  Another grasper 

then


grabbed down at Mccall's pouch and pulled in the Infero-lateral direction. The


cystic duct, and cystic artery were then dissected out. Clip was placed on the 

distal


portion of the cystic duct which was then partially transected. When it was cut 

we


got out 3 small stones. An arrow catheter was inserted into the duct. The 

cholangiogram 


was then performed. There looked like there may have been a filling defect (one 

small 


stone in the distal portion of duct; however, contrast made its way into the 

duodenum.  


Catheter removed. Clips were placed on proximal portion of the cystic duct and 

then the 


duct was then transected. Clips were placed along the proximal and distal 

portion of the 


cystic artery which was then transected. Hook cautery was used to dissect the 

gallbladder 


from the gallbladder fossa achieving hemostasis. The gallbladder was placed in 

an Endobag 


and removed through the 12 mm trocar site. The abdomen was then reinspected.  

Copious 


amounts of irrigation were used to irrigate the abdomen and there were no signs 

of active 


bleeding. Hemostasis had been achieved. The 12 mm fascial defect was then closed

with the


0 Vicryl suture that had been placed in a figure-of-eight fashion. The abdomen 

was then 


desufflated, the trocars were removed. The abdomen was then washed and dried. 

The skin was 


then closed using 4-0 Monocryl in a subcuticular fashion. The abdomen was washed

and dried 


and Skin Affix was place over incisions. Patient tolerated the procedure well 

without any 


complications and was taken to the recovery room in stable condition.


Anesthesia Type


GET





Estimated Blood Loss


Estimated blood loss (mL):  scant





Specimens/Packing


Specimens Removed


GB and contents











ITZ BUCIO DO                Oct 3, 2022 13:47

## 2022-10-04 VITALS — DIASTOLIC BLOOD PRESSURE: 58 MMHG | SYSTOLIC BLOOD PRESSURE: 107 MMHG

## 2022-10-04 VITALS — SYSTOLIC BLOOD PRESSURE: 107 MMHG | DIASTOLIC BLOOD PRESSURE: 58 MMHG

## 2022-10-04 VITALS — SYSTOLIC BLOOD PRESSURE: 111 MMHG | DIASTOLIC BLOOD PRESSURE: 70 MMHG

## 2022-10-04 VITALS — DIASTOLIC BLOOD PRESSURE: 65 MMHG | SYSTOLIC BLOOD PRESSURE: 108 MMHG

## 2022-10-04 LAB
ALBUMIN SERPL-MCNC: 3.1 GM/DL (ref 3.2–4.5)
ALP SERPL-CCNC: 118 U/L (ref 40–136)
ALT SERPL-CCNC: 86 U/L (ref 0–55)
BASOPHILS # BLD AUTO: 0 10^3/UL (ref 0–0.1)
BASOPHILS NFR BLD AUTO: 0 % (ref 0–10)
BILIRUB SERPL-MCNC: 0.4 MG/DL (ref 0.1–1)
BUN/CREAT SERPL: 8
CALCIUM SERPL-MCNC: 8.3 MG/DL (ref 8.5–10.1)
CHLORIDE SERPL-SCNC: 105 MMOL/L (ref 98–107)
CO2 SERPL-SCNC: 23 MMOL/L (ref 21–32)
CREAT SERPL-MCNC: 0.89 MG/DL (ref 0.6–1.3)
EOSINOPHIL # BLD AUTO: 0.4 10^3/UL (ref 0–0.3)
EOSINOPHIL NFR BLD AUTO: 6 % (ref 0–10)
GFR SERPLBLD BASED ON 1.73 SQ M-ARVRAT: 85 ML/MIN
GLUCOSE SERPL-MCNC: 123 MG/DL (ref 70–105)
HCT VFR BLD CALC: 32 % (ref 35–52)
HGB BLD-MCNC: 10.6 G/DL (ref 11.5–16)
LIPASE SERPL-CCNC: 26 U/L (ref 8–78)
LYMPHOCYTES # BLD AUTO: 2 10^3/UL (ref 1–4)
LYMPHOCYTES NFR BLD AUTO: 26 % (ref 12–44)
MANUAL DIFFERENTIAL PERFORMED BLD QL: NO
MCH RBC QN AUTO: 28 PG (ref 25–34)
MCHC RBC AUTO-ENTMCNC: 33 G/DL (ref 32–36)
MCV RBC AUTO: 84 FL (ref 80–99)
MONOCYTES # BLD AUTO: 0.6 10^3/UL (ref 0–1)
MONOCYTES NFR BLD AUTO: 8 % (ref 0–12)
NEUTROPHILS # BLD AUTO: 4.7 10^3/UL (ref 1.8–7.8)
NEUTROPHILS NFR BLD AUTO: 60 % (ref 42–75)
PLATELET # BLD: 341 10^3/UL (ref 130–400)
PMV BLD AUTO: 9.7 FL (ref 9–12.2)
POTASSIUM SERPL-SCNC: 3.9 MMOL/L (ref 3.6–5)
PROT SERPL-MCNC: 6.6 GM/DL (ref 6.4–8.2)
SODIUM SERPL-SCNC: 138 MMOL/L (ref 135–145)
WBC # BLD AUTO: 7.8 10^3/UL (ref 4.3–11)

## 2022-10-04 RX ADMIN — DOCUSATE SODIUM SCH MG: 100 CAPSULE ORAL at 08:13

## 2022-10-04 RX ADMIN — METOCLOPRAMIDE SCH MG: 5 INJECTION, SOLUTION INTRAMUSCULAR; INTRAVENOUS at 00:56

## 2022-10-04 RX ADMIN — DEXTROSE MONOHYDRATE AND SODIUM CHLORIDE SCH MLS/HR: 5; .9 INJECTION, SOLUTION INTRAVENOUS at 08:58

## 2022-10-04 RX ADMIN — INSULIN ASPART SCH UNIT: 100 INJECTION, SOLUTION INTRAVENOUS; SUBCUTANEOUS at 00:49

## 2022-10-04 RX ADMIN — METOCLOPRAMIDE SCH MG: 5 INJECTION, SOLUTION INTRAMUSCULAR; INTRAVENOUS at 06:50

## 2022-10-04 RX ADMIN — PANTOPRAZOLE SODIUM SCH MG: 40 INJECTION, POWDER, FOR SOLUTION INTRAVENOUS at 08:10

## 2022-10-04 RX ADMIN — INSULIN ASPART SCH UNIT: 100 INJECTION, SOLUTION INTRAVENOUS; SUBCUTANEOUS at 05:59

## 2022-10-04 RX ADMIN — SENNOSIDES SCH MG: 8.6 TABLET, FILM COATED ORAL at 08:13

## 2022-10-04 NOTE — PROGRESS NOTE - SURGERY
Subjective


Date Seen by a Provider:  Oct 4, 2022


Time Seen by a Provider:  07:05


Subjective/Events-last exam


POD1 from cholecystectomy. Pt is lying in bed comfortably after just waking up. 

Pt called a relative to help translate. She reports 4/10 pain in the RUQ that 

feels sore, achy, and tender to palpation. She winced at my auscultation and 

palpation of her abdomen. Her incisions are clean, dry, and intact. Pt reports 

being able to ambulate to bathroom. Urinated this AM without pain or redness. 

Has not had a BM or flatus since surgery. She denies any other complains such as

chest pain, shortness of breath, N/V, or fever with chills. She ate salmon and 

salad for dinner last night and kept it down. Slept well last night. Pt reports 

no other issues.


Review of Systems


General:  No Chills, No Night Sweats


HEENT:  No Head Aches, No Visual Changes


Pulmonary:  No Dyspnea, No Cough


Cardiovascular:  No: Chest Pain, Lt Headedness


Gastrointestinal:  Abdominal Pain (RUQ tender); No: Nausea, Vomiting


Genitourinary:  No Dysuria, No Hematuria





Objective


Exam





Vital Signs








  Date Time  Temp Pulse Resp B/P (MAP) Pulse Ox O2 Delivery O2 Flow Rate FiO2


 


10/4/22 07:50 36.3 72 18 107/58 (74) 93 Room Air  


 


10/4/22 04:36 36.8 57 18 108/65 (79) 95 Room Air  


 


10/4/22 00:20 36.7 58 18 111/70 (84) 95 Room Air  


 


10/3/22 19:32 36.5 73 18 114/56 (75) 97 Room Air  


 


10/3/22 15:24 36.3 56 18 110/69 (83) 97   


 


10/3/22 14:55   15 103/57 (72) 95 Room Air  


 


10/3/22 14:54      Room Air  


 


10/3/22 14:51 36.4  15 105/54 (71) 95 Room Air  


 


10/3/22 14:45      Room Air  


 


10/3/22 14:40   14 102/53 (69) 94 Room Air  


 


10/3/22 14:35      Room Air  


 


10/3/22 14:30   19 114/57 (76) 100 OxyMask 0.50 


 


10/3/22 14:20      OxyMask 2.00 


 


10/3/22 14:20   20 105/78 (87) 100 OxyMask 2.00 


 


10/3/22 14:10   11 119/65 (83) 100 OxyMask 4.00 


 


10/3/22 14:05      OxyMask 8.00 


 


10/3/22 14:00   15 112/73 (86) 100 OxyMask 8.00 


 


10/3/22 13:53      OxyMask 8.00 


 


10/3/22 13:53 36.1  16 100/52 (68) 98 OxyMask 8.00 


 


10/3/22 11:31 36.6 53 18 111/62 (78) 97 Room Air  














I & O 


 


 10/4/22





 07:00


 


Intake Total 1120 ml


 


Output Total 1400 ml


 


Balance -280 ml





Capillary Refill : Less Than 3 SecondsLess Than 3 Seconds


General Appearance:  No Apparent Distress, WD/WN


HEENT:  PERRL/EOMI, Moist Mucous Membranes; No Scleral Icterus (L), No Scleral 

Icterus (R)


Neck:  Non Tender, Supple; No Lymphadenopathy (L), No Lymphadenopathy (R)


Respiratory:  Lungs Clear, Normal Breath Sounds


Cardiovascular:  Regular Rate, Rhythm, No Murmur


Peripheral Pulses:  2+ Radial Pulses (R), 2+ Radial Pulses (L)


Gastrointestinal:  soft, no organomegaly, tenderness (RUQ 4/10 pain on 

palpation), hernia (small umbilical)


Extremity:  No Calf Tenderness, No Pedal Edema


Neurologic/Psychiatric:  Alert, Normal Mood/Affect; No Facial Droop


Skin:  Normal Color, Warm/Dry, Other (incisions are clean, dry and intact)


Lymphatic:  No Adenopathy (cervical)





Results


Lab


Laboratory Tests


10/3/22 11:06: Glucometer 140H


10/3/22 17:45: Glucometer 111H


10/3/22 23:50: Glucometer 139H


10/4/22 05:10: 


White Blood Count 7.8, Red Blood Count 3.81, Hemoglobin 10.6L, Hematocrit 32L, 

Mean Corpuscular Volume 84, Mean Corpuscular Hemoglobin 28, Mean Corpuscular 

Hemoglobin Concent 33, Red Cell Distribution Width 11.8, Platelet Count 341, 

Mean Platelet Volume 9.7, Immature Granulocyte % (Auto) 0, Neutrophils (%) 

(Auto) 60, Lymphocytes (%) (Auto) 26, Monocytes (%) (Auto) 8, Eosinophils (%) 

(Auto) 6, Basophils (%) (Auto) 0, Neutrophils # (Auto) 4.7, Lymphocytes # (Auto)

2.0, Monocytes # (Auto) 0.6, Eosinophils # (Auto) 0.4H, Basophils # (Auto) 0.0, 

Immature Granulocyte # (Auto) 0.0, Sodium Level 138, Potassium Level 3.9, 

Chloride Level 105, Carbon Dioxide Level 23, Anion Gap 10, Blood Urea Nitrogen 

7, Creatinine 0.89, Estimat Glomerular Filtration Rate 85, BUN/Creatinine Ratio 

8, Glucose Level 123H, Calcium Level 8.3L, Corrected Calcium 9.0, Total 

Bilirubin 0.4, Aspartate Amino Transf (AST/SGOT) 49H, Alanine Aminotransferase 

(ALT/SGPT) 86H, Alkaline Phosphatase 118, Total Protein 6.6, Albumin 3.1L, 

Lipase 26





Microbiology


10/2/22 MRSA Screen - Final, Complete


          MRSA not isolated


10/1/22 Urine Culture - Final, Complete


          Gram Pos Mixed Bacterial Gayatri





Assessment/Plan


Cholelithiasis - POD 1 from cholecystectomy


Pancreatitis - resolved lipase 26 today


Transaminitis- improving AST and ALT


Hyperglycemia - 123 this morning


UTI





Pt is doing well postoperatively. She is voiding urine without issue. Has not 

had BM or flatus. Pancreatitis seems resolved with lipase of 26 and WBC count of

7.8. Plan is to discharge today.











KISHA MARTÍNEZ                    Oct 4, 2022 08:10

## 2022-10-04 NOTE — DISCHARGE SUMMARY
Discharge Summary


Hospital Course


Was the Problem List Reviewed?:  Yes


Problems/Dx:  


(1) Acute pancreatitis


Status:  Acute


Qualifiers:  


   Qualified Codes:  K85.90 - Acute pancreatitis without necrosis or infection, 

unspecified


(2) Cholelithiasis


Status:  Acute


Qualifiers:  


   Qualified Codes:  K80.20 - Calculus of gallbladder without cholecystitis w

[facility] obstruction


Hospital Course


Date of Admission: Oct 1, 2022 at 10:53 


Admission Diagnosis :  





Family Physician/Provider: [place]  





Date of Discharge: 10/4/22 


Discharge Diagnosis: [ ]








Hospital Course:


38 yo F presented to the ED on 10/1 with c/o 10/10 diffuse postprandial abd pain

that radiated to her chest that started 1 week ago and has been worsening. Pt 

had a CT ABD/Pelvis on 10/1 that showed acute uncomplicated pancreatitis with 

cholelithiasis. Pt was admitted for acute pancreatitis and cholelithiasis. Work 

up found an elevated lipase, WBC, and AST/ALT and ALP. Pt was placed on IVF, 

oxycodone (10mg prn), hydromorphone (0.5mg prn), and pantoprazole (40mg) to 

stabilize for cholescytecomy today. Pt's lipase, WBC, AST/ALT and ALP have all 

trended down and patient currently denies any pain.














Labs and Pending Lab Test:


Laboratory Tests


10/3/22 11:06: Glucometer 140H


10/3/22 17:45: Glucometer 111H


10/3/22 23:50: Glucometer 139H


10/4/22 05:10: 


White Blood Count 7.8, Red Blood Count 3.81, Hemoglobin 10.6L, Hematocrit 32L, 

Mean Corpuscular Volume 84, Mean Corpuscular Hemoglobin 28, Mean Corpuscular 

Hemoglobin Concent 33, Red Cell Distribution Width 11.8, Platelet Count 341, 

Mean Platelet Volume 9.7, Immature Granulocyte % (Auto) 0, Neutrophils (%) 

(Auto) 60, Lymphocytes (%) (Auto) 26, Monocytes (%) (Auto) 8, Eosinophils (%) 

(Auto) 6, Basophils (%) (Auto) 0, Neutrophils # (Auto) 4.7, Lymphocytes # (Auto)

2.0, Monocytes # (Auto) 0.6, Eosinophils # (Auto) 0.4H, Basophils # (Auto) 0.0, 

Immature Granulocyte # (Auto) 0.0, Sodium Level 138, Potassium Level 3.9, 

Chloride Level 105, Carbon Dioxide Level 23, Anion Gap 10, Blood Urea Nitrogen 

7, Creatinine 0.89, Estimat Glomerular Filtration Rate 85, BUN/Creatinine Ratio 

8, Glucose Level 123H, Calcium Level 8.3L, Corrected Calcium 9.0, Total 

Bilirubin 0.4, Aspartate Amino Transf (AST/SGOT) 49H, Alanine Aminotransferase 

(ALT/SGPT) 86H, Alkaline Phosphatase 118, Total Protein 6.6, Albumin 3.1L, 

Lipase 26





Microbiology


10/2/22 MRSA Screen - Final, Complete


          MRSA not isolated


10/1/22 Urine Culture - Final, Complete


          Gram Pos Mixed Bacterial Gayatri





Home Meds


Active


Reported


Pantoprazole Sodium 40 Mg Tablet.dr 40 Mg PO DAILY


Assessment/Pt Instructions


PCP 1 week


Post op appt per Dr Ramirez


Discharge Planning:  <30 minutes discharge planning





Discharge Instructions


Discharge Diet:  No Restrictions





Discharge Physical Examination


Vital Signs





Vital Signs








  Date Time  Temp Pulse Resp B/P (MAP) Pulse Ox O2 Delivery O2 Flow Rate FiO2


 


10/4/22 04:36 36.8 57 18 108/65 (79) 95 Room Air  


 


10/3/22 14:30       0.50 


 


10/1/22 11:46        21








General Appearance:  No Apparent Distress, WD/WN, Chronically ill, Obese


Allergies:  


Coded Allergies:  


     No Known Drug Allergies (Unverified , 9/26/22)





Discharge Summary


Date of Admission


Oct 1, 2022 at 10:53


Date of Discharge





Discharge Date:  Oct 4, 2022


Admission Diagnosis


Assessment:


Acute pancreatitis


Abnormal gallbladder on scans


DM


Elevated LFT's





Plan:


Pain meds


IVF


Monitor closely


Discharge Diagnosis


Cholecystectomy











MARIUM SCHAEFFER DO                 Oct 4, 2022 06:39